# Patient Record
Sex: MALE | Race: WHITE | NOT HISPANIC OR LATINO | Employment: OTHER | URBAN - METROPOLITAN AREA
[De-identification: names, ages, dates, MRNs, and addresses within clinical notes are randomized per-mention and may not be internally consistent; named-entity substitution may affect disease eponyms.]

---

## 2023-08-11 ENCOUNTER — HOSPITAL ENCOUNTER (INPATIENT)
Facility: MEDICAL CENTER | Age: 68
LOS: 3 days | DRG: 442 | End: 2023-08-14
Attending: HOSPITALIST | Admitting: HOSPITALIST
Payer: MEDICARE

## 2023-08-11 DIAGNOSIS — I81 PORTAL VEIN THROMBOSIS: ICD-10-CM

## 2023-08-11 DIAGNOSIS — K74.60 CIRRHOSIS OF LIVER WITHOUT ASCITES, UNSPECIFIED HEPATIC CIRRHOSIS TYPE (HCC): ICD-10-CM

## 2023-08-11 PROBLEM — R91.8 PULMONARY NODULES: Status: ACTIVE | Noted: 2023-08-11

## 2023-08-11 PROBLEM — Z71.89 ACP (ADVANCE CARE PLANNING): Status: ACTIVE | Noted: 2023-08-11

## 2023-08-11 PROBLEM — Z72.0 TOBACCO ABUSE: Status: ACTIVE | Noted: 2023-08-11

## 2023-08-11 PROBLEM — D45 POLYCYTHEMIA VERA (HCC): Status: ACTIVE | Noted: 2023-08-11

## 2023-08-11 PROBLEM — I85.00 ESOPHAGEAL VARICES WITHOUT BLEEDING (HCC): Status: ACTIVE | Noted: 2023-08-11

## 2023-08-11 PROBLEM — R79.89 ELEVATED LFTS: Status: ACTIVE | Noted: 2023-08-11

## 2023-08-11 PROBLEM — R10.9 ABDOMINAL PAIN: Status: ACTIVE | Noted: 2023-08-11

## 2023-08-11 LAB
ALBUMIN SERPL BCP-MCNC: 4.3 G/DL (ref 3.2–4.9)
ALBUMIN/GLOB SERPL: 1.1 G/DL
ALP SERPL-CCNC: 72 U/L (ref 30–99)
ALT SERPL-CCNC: 84 U/L (ref 2–50)
AMMONIA PLAS-SCNC: 37 UMOL/L (ref 11–45)
AMPHET UR QL SCN: NEGATIVE
ANION GAP SERPL CALC-SCNC: 15 MMOL/L (ref 7–16)
APPEARANCE UR: CLEAR
APTT PPP: 29.4 SEC (ref 24.7–36)
APTT PPP: 31.5 SEC (ref 24.7–36)
AST SERPL-CCNC: 50 U/L (ref 12–45)
BARBITURATES UR QL SCN: NEGATIVE
BASOPHILS # BLD AUTO: 0.2 % (ref 0–1.8)
BASOPHILS # BLD: 0.02 K/UL (ref 0–0.12)
BENZODIAZ UR QL SCN: NEGATIVE
BILIRUB SERPL-MCNC: 0.6 MG/DL (ref 0.1–1.5)
BILIRUB UR QL STRIP.AUTO: NEGATIVE
BUN SERPL-MCNC: 14 MG/DL (ref 8–22)
BZE UR QL SCN: NEGATIVE
CALCIUM ALBUM COR SERPL-MCNC: 9.3 MG/DL (ref 8.5–10.5)
CALCIUM SERPL-MCNC: 9.5 MG/DL (ref 8.5–10.5)
CANNABINOIDS UR QL SCN: POSITIVE
CFT BLD TEG: 5.6 MIN (ref 4.6–9.1)
CFT P HPASE BLD TEG: 5.8 MIN (ref 4.3–8.3)
CHLORIDE SERPL-SCNC: 99 MMOL/L (ref 96–112)
CLOT ANGLE BLD TEG: 70.2 DEGREES (ref 63–78)
CLOT LYSIS 30M P MA LENFR BLD TEG: 0 % (ref 0–2.6)
CO2 SERPL-SCNC: 20 MMOL/L (ref 20–33)
COLOR UR: YELLOW
CREAT SERPL-MCNC: 0.9 MG/DL (ref 0.5–1.4)
CRP SERPL HS-MCNC: 2.42 MG/DL (ref 0–0.75)
CT.EXTRINSIC BLD ROTEM: 1.9 MIN (ref 0.8–2.1)
D DIMER PPP IA.FEU-MCNC: 3.25 UG/ML (FEU) (ref 0–0.5)
EOSINOPHIL # BLD AUTO: 0.01 K/UL (ref 0–0.51)
EOSINOPHIL NFR BLD: 0.1 % (ref 0–6.9)
ERYTHROCYTE [DISTWIDTH] IN BLOOD BY AUTOMATED COUNT: 45.3 FL (ref 35.9–50)
ERYTHROCYTE [SEDIMENTATION RATE] IN BLOOD BY WESTERGREN METHOD: 1 MM/HOUR (ref 0–20)
FENTANYL UR QL: NEGATIVE
FERRITIN SERPL-MCNC: 979 NG/ML (ref 22–322)
FIBRINOGEN PPP-MCNC: 437 MG/DL (ref 215–460)
FOLATE SERPL-MCNC: 13.6 NG/ML
GFR SERPLBLD CREATININE-BSD FMLA CKD-EPI: 93 ML/MIN/1.73 M 2
GGT SERPL-CCNC: 70 U/L (ref 7–51)
GLOBULIN SER CALC-MCNC: 3.9 G/DL (ref 1.9–3.5)
GLUCOSE SERPL-MCNC: 104 MG/DL (ref 65–99)
GLUCOSE UR STRIP.AUTO-MCNC: NEGATIVE MG/DL
HAV IGM SERPL QL IA: ABNORMAL
HBV CORE IGM SER QL: ABNORMAL
HBV SURFACE AG SER QL: ABNORMAL
HCT VFR BLD AUTO: 46.9 % (ref 42–52)
HCV AB SER QL: REACTIVE
HGB BLD-MCNC: 16.4 G/DL (ref 14–18)
HGB RETIC QN AUTO: 36.8 PG/CELL (ref 29–35)
HIV 1+2 AB+HIV1 P24 AG SERPL QL IA: NORMAL
IMM GRANULOCYTES # BLD AUTO: 0.04 K/UL (ref 0–0.11)
IMM GRANULOCYTES NFR BLD AUTO: 0.4 % (ref 0–0.9)
IMM RETICS NFR: 9.5 % (ref 2.6–16.1)
INR PPP: 1.1 (ref 0.87–1.13)
IRON SATN MFR SERPL: 14 % (ref 15–55)
IRON SERPL-MCNC: 45 UG/DL (ref 50–180)
KETONES UR STRIP.AUTO-MCNC: ABNORMAL MG/DL
LDH SERPL L TO P-CCNC: 258 U/L (ref 107–266)
LEUKOCYTE ESTERASE UR QL STRIP.AUTO: NEGATIVE
LYMPHOCYTES # BLD AUTO: 1.78 K/UL (ref 1–4.8)
LYMPHOCYTES NFR BLD: 18 % (ref 22–41)
MAGNESIUM SERPL-MCNC: 2.2 MG/DL (ref 1.5–2.5)
MCF BLD TEG: 51.6 MM (ref 52–69)
MCF.PLATELET INHIB BLD ROTEM: 19.2 MM (ref 15–32)
MCH RBC QN AUTO: 33.4 PG (ref 27–33)
MCHC RBC AUTO-ENTMCNC: 35 G/DL (ref 32.3–36.5)
MCV RBC AUTO: 95.5 FL (ref 81.4–97.8)
METHADONE UR QL SCN: NEGATIVE
MICRO URNS: ABNORMAL
MONOCYTES # BLD AUTO: 0.85 K/UL (ref 0–0.85)
MONOCYTES NFR BLD AUTO: 8.6 % (ref 0–13.4)
NEUTROPHILS # BLD AUTO: 7.2 K/UL (ref 1.82–7.42)
NEUTROPHILS NFR BLD: 72.7 % (ref 44–72)
NITRITE UR QL STRIP.AUTO: NEGATIVE
NRBC # BLD AUTO: 0 K/UL
NRBC BLD-RTO: 0 /100 WBC (ref 0–0.2)
OPIATES UR QL SCN: NEGATIVE
OXYCODONE UR QL SCN: NEGATIVE
PCP UR QL SCN: NEGATIVE
PH UR STRIP.AUTO: 6 [PH] (ref 5–8)
PHOSPHATE SERPL-MCNC: 2.6 MG/DL (ref 2.5–4.5)
PLATELET # BLD AUTO: 142 K/UL (ref 164–446)
PMV BLD AUTO: 9.8 FL (ref 9–12.9)
POTASSIUM SERPL-SCNC: 4.1 MMOL/L (ref 3.6–5.5)
PROPOXYPH UR QL SCN: NEGATIVE
PROT SERPL-MCNC: 8.2 G/DL (ref 6–8.2)
PROT UR QL STRIP: NEGATIVE MG/DL
PROTHROMBIN TIME: 14.1 SEC (ref 12–14.6)
RBC # BLD AUTO: 4.91 M/UL (ref 4.7–6.1)
RBC UR QL AUTO: NEGATIVE
RETICS # AUTO: 0.09 M/UL (ref 0.04–0.12)
RETICS/RBC NFR: 1.8 % (ref 0.8–2.6)
SODIUM SERPL-SCNC: 134 MMOL/L (ref 135–145)
SP GR UR STRIP.AUTO: >=1.045
TEG ALGORITHM TGALG: ABNORMAL
TIBC SERPL-MCNC: 318 UG/DL (ref 250–450)
TRANSFERRIN SERPL-MCNC: 267 MG/DL (ref 200–370)
UFH PPP CHRO-ACNC: 0.38 IU/ML
UIBC SERPL-MCNC: 273 UG/DL (ref 110–370)
UROBILINOGEN UR STRIP.AUTO-MCNC: 1 MG/DL
VIT B12 SERPL-MCNC: 674 PG/ML (ref 211–911)
WBC # BLD AUTO: 9.9 K/UL (ref 4.8–10.8)

## 2023-08-11 PROCEDURE — 85730 THROMBOPLASTIN TIME PARTIAL: CPT | Mod: 91

## 2023-08-11 PROCEDURE — 86038 ANTINUCLEAR ANTIBODIES: CPT

## 2023-08-11 PROCEDURE — 81003 URINALYSIS AUTO W/O SCOPE: CPT

## 2023-08-11 PROCEDURE — 86015 ACTIN ANTIBODY EACH: CPT

## 2023-08-11 PROCEDURE — 700105 HCHG RX REV CODE 258: Performed by: STUDENT IN AN ORGANIZED HEALTH CARE EDUCATION/TRAINING PROGRAM

## 2023-08-11 PROCEDURE — 82140 ASSAY OF AMMONIA: CPT

## 2023-08-11 PROCEDURE — 85576 BLOOD PLATELET AGGREGATION: CPT

## 2023-08-11 PROCEDURE — 82746 ASSAY OF FOLIC ACID SERUM: CPT

## 2023-08-11 PROCEDURE — 83735 ASSAY OF MAGNESIUM: CPT

## 2023-08-11 PROCEDURE — 700111 HCHG RX REV CODE 636 W/ 250 OVERRIDE (IP): Performed by: STUDENT IN AN ORGANIZED HEALTH CARE EDUCATION/TRAINING PROGRAM

## 2023-08-11 PROCEDURE — 85347 COAGULATION TIME ACTIVATED: CPT

## 2023-08-11 PROCEDURE — 83540 ASSAY OF IRON: CPT

## 2023-08-11 PROCEDURE — 85520 HEPARIN ASSAY: CPT

## 2023-08-11 PROCEDURE — 85379 FIBRIN DEGRADATION QUANT: CPT

## 2023-08-11 PROCEDURE — 85384 FIBRINOGEN ACTIVITY: CPT

## 2023-08-11 PROCEDURE — 85025 COMPLETE CBC W/AUTO DIFF WBC: CPT

## 2023-08-11 PROCEDURE — 85306 CLOT INHIBIT PROT S FREE: CPT

## 2023-08-11 PROCEDURE — 82105 ALPHA-FETOPROTEIN SERUM: CPT

## 2023-08-11 PROCEDURE — G0475 HIV COMBINATION ASSAY: HCPCS

## 2023-08-11 PROCEDURE — 99497 ADVNCD CARE PLAN 30 MIN: CPT | Mod: 25 | Performed by: STUDENT IN AN ORGANIZED HEALTH CARE EDUCATION/TRAINING PROGRAM

## 2023-08-11 PROCEDURE — 80307 DRUG TEST PRSMV CHEM ANLYZR: CPT

## 2023-08-11 PROCEDURE — 85301 ANTITHROMBIN III ANTIGEN: CPT

## 2023-08-11 PROCEDURE — 84466 ASSAY OF TRANSFERRIN: CPT

## 2023-08-11 PROCEDURE — 83010 ASSAY OF HAPTOGLOBIN QUANT: CPT

## 2023-08-11 PROCEDURE — 82728 ASSAY OF FERRITIN: CPT

## 2023-08-11 PROCEDURE — 85652 RBC SED RATE AUTOMATED: CPT

## 2023-08-11 PROCEDURE — 770006 HCHG ROOM/CARE - MED/SURG/GYN SEMI*

## 2023-08-11 PROCEDURE — 85303 CLOT INHIBIT PROT C ACTIVITY: CPT

## 2023-08-11 PROCEDURE — 86140 C-REACTIVE PROTEIN: CPT

## 2023-08-11 PROCEDURE — 80074 ACUTE HEPATITIS PANEL: CPT

## 2023-08-11 PROCEDURE — 84100 ASSAY OF PHOSPHORUS: CPT

## 2023-08-11 PROCEDURE — 36415 COLL VENOUS BLD VENIPUNCTURE: CPT

## 2023-08-11 PROCEDURE — 86381 MITOCHONDRIAL ANTIBODY EACH: CPT

## 2023-08-11 PROCEDURE — 82607 VITAMIN B-12: CPT

## 2023-08-11 PROCEDURE — 81270 JAK2 GENE: CPT

## 2023-08-11 PROCEDURE — 81338 MPL GENE COMMON VARIANTS: CPT

## 2023-08-11 PROCEDURE — 83615 LACTATE (LD) (LDH) ENZYME: CPT

## 2023-08-11 PROCEDURE — 85300 ANTITHROMBIN III ACTIVITY: CPT

## 2023-08-11 PROCEDURE — 99223 1ST HOSP IP/OBS HIGH 75: CPT | Mod: 25,AI | Performed by: STUDENT IN AN ORGANIZED HEALTH CARE EDUCATION/TRAINING PROGRAM

## 2023-08-11 PROCEDURE — 80053 COMPREHEN METABOLIC PANEL: CPT

## 2023-08-11 PROCEDURE — 81219 CALR GENE COM VARIANTS: CPT

## 2023-08-11 PROCEDURE — 85046 RETICYTE/HGB CONCENTRATE: CPT

## 2023-08-11 PROCEDURE — 82977 ASSAY OF GGT: CPT

## 2023-08-11 PROCEDURE — 83550 IRON BINDING TEST: CPT

## 2023-08-11 PROCEDURE — 87522 HEPATITIS C REVRS TRNSCRPJ: CPT

## 2023-08-11 PROCEDURE — 85610 PROTHROMBIN TIME: CPT

## 2023-08-11 PROCEDURE — C9113 INJ PANTOPRAZOLE SODIUM, VIA: HCPCS | Performed by: STUDENT IN AN ORGANIZED HEALTH CARE EDUCATION/TRAINING PROGRAM

## 2023-08-11 PROCEDURE — 99406 BEHAV CHNG SMOKING 3-10 MIN: CPT | Performed by: STUDENT IN AN ORGANIZED HEALTH CARE EDUCATION/TRAINING PROGRAM

## 2023-08-11 RX ORDER — ONDANSETRON 2 MG/ML
4 INJECTION INTRAMUSCULAR; INTRAVENOUS EVERY 4 HOURS PRN
Status: DISCONTINUED | OUTPATIENT
Start: 2023-08-11 | End: 2023-08-14 | Stop reason: HOSPADM

## 2023-08-11 RX ORDER — PANTOPRAZOLE SODIUM 40 MG/10ML
40 INJECTION, POWDER, LYOPHILIZED, FOR SOLUTION INTRAVENOUS 2 TIMES DAILY
Status: DISCONTINUED | OUTPATIENT
Start: 2023-08-11 | End: 2023-08-13

## 2023-08-11 RX ORDER — HEPARIN SODIUM 5000 [USP'U]/100ML
0-30 INJECTION, SOLUTION INTRAVENOUS CONTINUOUS
Status: DISCONTINUED | OUTPATIENT
Start: 2023-08-11 | End: 2023-08-11

## 2023-08-11 RX ORDER — POLYETHYLENE GLYCOL 3350 17 G/17G
1 POWDER, FOR SOLUTION ORAL
Status: DISCONTINUED | OUTPATIENT
Start: 2023-08-11 | End: 2023-08-14 | Stop reason: HOSPADM

## 2023-08-11 RX ORDER — OXYCODONE HYDROCHLORIDE 5 MG/1
5 TABLET ORAL
Status: DISCONTINUED | OUTPATIENT
Start: 2023-08-11 | End: 2023-08-14 | Stop reason: HOSPADM

## 2023-08-11 RX ORDER — ONDANSETRON 4 MG/1
4 TABLET, ORALLY DISINTEGRATING ORAL EVERY 4 HOURS PRN
Status: DISCONTINUED | OUTPATIENT
Start: 2023-08-11 | End: 2023-08-14 | Stop reason: HOSPADM

## 2023-08-11 RX ORDER — LABETALOL HYDROCHLORIDE 5 MG/ML
10 INJECTION, SOLUTION INTRAVENOUS EVERY 4 HOURS PRN
Status: DISCONTINUED | OUTPATIENT
Start: 2023-08-11 | End: 2023-08-14 | Stop reason: HOSPADM

## 2023-08-11 RX ORDER — OXYCODONE HYDROCHLORIDE 10 MG/1
10 TABLET ORAL
Status: DISCONTINUED | OUTPATIENT
Start: 2023-08-11 | End: 2023-08-14 | Stop reason: HOSPADM

## 2023-08-11 RX ORDER — SODIUM CHLORIDE, SODIUM LACTATE, POTASSIUM CHLORIDE, AND CALCIUM CHLORIDE .6; .31; .03; .02 G/100ML; G/100ML; G/100ML; G/100ML
500 INJECTION, SOLUTION INTRAVENOUS
Status: DISCONTINUED | OUTPATIENT
Start: 2023-08-11 | End: 2023-08-14 | Stop reason: HOSPADM

## 2023-08-11 RX ORDER — HYDROMORPHONE HYDROCHLORIDE 1 MG/ML
0.5 INJECTION, SOLUTION INTRAMUSCULAR; INTRAVENOUS; SUBCUTANEOUS
Status: DISCONTINUED | OUTPATIENT
Start: 2023-08-11 | End: 2023-08-12

## 2023-08-11 RX ORDER — HEPARIN SODIUM 1000 [USP'U]/ML
40 INJECTION, SOLUTION INTRAVENOUS; SUBCUTANEOUS PRN
Status: DISCONTINUED | OUTPATIENT
Start: 2023-08-11 | End: 2023-08-11

## 2023-08-11 RX ORDER — BISACODYL 10 MG
10 SUPPOSITORY, RECTAL RECTAL
Status: DISCONTINUED | OUTPATIENT
Start: 2023-08-11 | End: 2023-08-14 | Stop reason: HOSPADM

## 2023-08-11 RX ORDER — SODIUM CHLORIDE 9 MG/ML
INJECTION, SOLUTION INTRAVENOUS CONTINUOUS
Status: DISCONTINUED | OUTPATIENT
Start: 2023-08-11 | End: 2023-08-13

## 2023-08-11 RX ORDER — AMOXICILLIN 250 MG
2 CAPSULE ORAL 2 TIMES DAILY
Status: DISCONTINUED | OUTPATIENT
Start: 2023-08-11 | End: 2023-08-14 | Stop reason: HOSPADM

## 2023-08-11 RX ADMIN — SODIUM CHLORIDE: 9 INJECTION, SOLUTION INTRAVENOUS at 18:21

## 2023-08-11 RX ADMIN — PANTOPRAZOLE SODIUM 40 MG: 40 INJECTION, POWDER, FOR SOLUTION INTRAVENOUS at 18:21

## 2023-08-11 ASSESSMENT — LIFESTYLE VARIABLES
HAVE YOU EVER FELT YOU SHOULD CUT DOWN ON YOUR DRINKING: NO
TOTAL SCORE: 0
SUBSTANCE_ABUSE: 0
CONSUMPTION TOTAL: INCOMPLETE
EVER HAD A DRINK FIRST THING IN THE MORNING TO STEADY YOUR NERVES TO GET RID OF A HANGOVER: NO
ALCOHOL_USE: YES
EVER HAD A DRINK FIRST THING IN THE MORNING TO STEADY YOUR NERVES TO GET RID OF A HANGOVER: NO
TOTAL SCORE: 0
TOTAL SCORE: 0
HAVE PEOPLE ANNOYED YOU BY CRITICIZING YOUR DRINKING: NO
DOES PATIENT WANT TO STOP DRINKING: NO
HAVE YOU EVER FELT YOU SHOULD CUT DOWN ON YOUR DRINKING: NO
HAVE PEOPLE ANNOYED YOU BY CRITICIZING YOUR DRINKING: NO
HAVE YOU EVER FELT YOU SHOULD CUT DOWN ON YOUR DRINKING: NO
CONSUMPTION TOTAL: INCOMPLETE
TOTAL SCORE: 0
EVER FELT BAD OR GUILTY ABOUT YOUR DRINKING: NO
HAVE PEOPLE ANNOYED YOU BY CRITICIZING YOUR DRINKING: NO
EVER HAD A DRINK FIRST THING IN THE MORNING TO STEADY YOUR NERVES TO GET RID OF A HANGOVER: NO
ON A TYPICAL DAY WHEN YOU DRINK ALCOHOL HOW MANY DRINKS DO YOU HAVE: 0
CONSUMPTION TOTAL: NEGATIVE
TOTAL SCORE: 0
AVERAGE NUMBER OF DAYS PER WEEK YOU HAVE A DRINK CONTAINING ALCOHOL: 0
TOTAL SCORE: 0
ALCOHOL_USE: YES
EVER FELT BAD OR GUILTY ABOUT YOUR DRINKING: NO
TOTAL SCORE: 0
TOTAL SCORE: 0
EVER FELT BAD OR GUILTY ABOUT YOUR DRINKING: NO
ALCOHOL_USE: NO
TOTAL SCORE: 0
HOW MANY TIMES IN THE PAST YEAR HAVE YOU HAD 5 OR MORE DRINKS IN A DAY: 0

## 2023-08-11 ASSESSMENT — ENCOUNTER SYMPTOMS
NAUSEA: 1
BRUISES/BLEEDS EASILY: 0
DIZZINESS: 0
HEADACHES: 0
DOUBLE VISION: 0
PALPITATIONS: 0
COUGH: 0
SHORTNESS OF BREATH: 0
BLOOD IN STOOL: 0
MYALGIAS: 0
DEPRESSION: 0
HEARTBURN: 1
FEVER: 0
CHILLS: 0
ABDOMINAL PAIN: 1
BLURRED VISION: 0
VOMITING: 1

## 2023-08-11 ASSESSMENT — COGNITIVE AND FUNCTIONAL STATUS - GENERAL
DAILY ACTIVITIY SCORE: 24
SUGGESTED CMS G CODE MODIFIER MOBILITY: CH
MOBILITY SCORE: 24
SUGGESTED CMS G CODE MODIFIER DAILY ACTIVITY: CH

## 2023-08-11 ASSESSMENT — PATIENT HEALTH QUESTIONNAIRE - PHQ9
2. FEELING DOWN, DEPRESSED, IRRITABLE, OR HOPELESS: NOT AT ALL
SUM OF ALL RESPONSES TO PHQ9 QUESTIONS 1 AND 2: 0
1. LITTLE INTEREST OR PLEASURE IN DOING THINGS: NOT AT ALL

## 2023-08-11 ASSESSMENT — PAIN DESCRIPTION - PAIN TYPE: TYPE: ACUTE PAIN

## 2023-08-11 NOTE — PROGRESS NOTES
"[facility] TRIAGE OFFICER DIRECT ADMISSION REPORT  Transferring facility: [facility]     Transferring physician: Dr Hyman    Chief complaint: Abdominal pain    Pertinent history & patient course: 68 years old male with no significant past medical history of tobacco use and intermittent alcohol use, reportedly stopped 4 months ago.  Initially presented at the transferring facility with abdominal pain with no bleeding, vital signs are within normal limits.  Hemoglobin 18, platelets 159.  CT imaging shows evidence of occlusive thrombus of the portal vein with surrounding thrombophlebitis.  The patient will be started on heparin drip.  He will require pain control, GI consultation to rule out varices with possible need for banding.  He will also need thrombophilia screening including JAK2 mutation screen and cancer screening.     Code Status: Full per transferring provider, I personally verified with the transferring provider patient's code status and the transferring provider has confirmed this with the patient.    Patient accepted for transfer: Yes    Consultants to be called upon arrival: Will need gastroenterology    Admission status: Inpatient.     Floor requested: Medical    Please inform the \"Triage Coord.-RTOC\" through Vo[employer] upon arrival of the patient to [facility] for assignment of a hospitalist to perform admission. Reach out to the assigned hospitalist (assigned by RTOC) for any questions or concerns regarding the care of this patient.          "

## 2023-08-12 ENCOUNTER — APPOINTMENT (OUTPATIENT)
Dept: RADIOLOGY | Facility: MEDICAL CENTER | Age: 68
DRG: 442 | End: 2023-08-12
Payer: MEDICARE

## 2023-08-12 ENCOUNTER — APPOINTMENT (OUTPATIENT)
Dept: RADIOLOGY | Facility: MEDICAL CENTER | Age: 68
DRG: 442 | End: 2023-08-12
Attending: NURSE PRACTITIONER
Payer: MEDICARE

## 2023-08-12 LAB
ALBUMIN SERPL BCP-MCNC: 3.4 G/DL (ref 3.2–4.9)
ALBUMIN/GLOB SERPL: 1.1 G/DL
ALP SERPL-CCNC: 58 U/L (ref 30–99)
ALT SERPL-CCNC: 66 U/L (ref 2–50)
ANION GAP SERPL CALC-SCNC: 10 MMOL/L (ref 7–16)
APTT PPP: 27.5 SEC (ref 24.7–36)
AST SERPL-CCNC: 45 U/L (ref 12–45)
BASOPHILS # BLD AUTO: 0.5 % (ref 0–1.8)
BASOPHILS # BLD: 0.03 K/UL (ref 0–0.12)
BILIRUB SERPL-MCNC: 0.6 MG/DL (ref 0.1–1.5)
BUN SERPL-MCNC: 12 MG/DL (ref 8–22)
CALCIUM ALBUM COR SERPL-MCNC: 9.1 MG/DL (ref 8.5–10.5)
CALCIUM SERPL-MCNC: 8.6 MG/DL (ref 8.5–10.5)
CHLORIDE SERPL-SCNC: 103 MMOL/L (ref 96–112)
CO2 SERPL-SCNC: 23 MMOL/L (ref 20–33)
CREAT SERPL-MCNC: 0.87 MG/DL (ref 0.5–1.4)
EOSINOPHIL # BLD AUTO: 0.1 K/UL (ref 0–0.51)
EOSINOPHIL NFR BLD: 1.5 % (ref 0–6.9)
ERYTHROCYTE [DISTWIDTH] IN BLOOD BY AUTOMATED COUNT: 45.7 FL (ref 35.9–50)
GFR SERPLBLD CREATININE-BSD FMLA CKD-EPI: 94 ML/MIN/1.73 M 2
GLOBULIN SER CALC-MCNC: 3.2 G/DL (ref 1.9–3.5)
GLUCOSE SERPL-MCNC: 88 MG/DL (ref 65–99)
HCT VFR BLD AUTO: 41.8 % (ref 42–52)
HCT VFR BLD AUTO: 45.1 % (ref 42–52)
HEMOCCULT STL QL: NEGATIVE
HGB BLD-MCNC: 14.5 G/DL (ref 14–18)
HGB BLD-MCNC: 15.5 G/DL (ref 14–18)
IMM GRANULOCYTES # BLD AUTO: 0.03 K/UL (ref 0–0.11)
IMM GRANULOCYTES NFR BLD AUTO: 0.5 % (ref 0–0.9)
INR PPP: 1.09 (ref 0.87–1.13)
LYMPHOCYTES # BLD AUTO: 1.97 K/UL (ref 1–4.8)
LYMPHOCYTES NFR BLD: 29.8 % (ref 22–41)
MAGNESIUM SERPL-MCNC: 2.1 MG/DL (ref 1.5–2.5)
MCH RBC QN AUTO: 33 PG (ref 27–33)
MCHC RBC AUTO-ENTMCNC: 34.7 G/DL (ref 32.3–36.5)
MCV RBC AUTO: 95.2 FL (ref 81.4–97.8)
MONOCYTES # BLD AUTO: 0.77 K/UL (ref 0–0.85)
MONOCYTES NFR BLD AUTO: 11.6 % (ref 0–13.4)
NEUTROPHILS # BLD AUTO: 3.72 K/UL (ref 1.82–7.42)
NEUTROPHILS NFR BLD: 56.1 % (ref 44–72)
NRBC # BLD AUTO: 0 K/UL
NRBC BLD-RTO: 0 /100 WBC (ref 0–0.2)
PHOSPHATE SERPL-MCNC: 3.1 MG/DL (ref 2.5–4.5)
PLATELET # BLD AUTO: 125 K/UL (ref 164–446)
PMV BLD AUTO: 10 FL (ref 9–12.9)
POTASSIUM SERPL-SCNC: 4 MMOL/L (ref 3.6–5.5)
PROT SERPL-MCNC: 6.6 G/DL (ref 6–8.2)
PROTHROMBIN TIME: 13.9 SEC (ref 12–14.6)
RBC # BLD AUTO: 4.39 M/UL (ref 4.7–6.1)
SODIUM SERPL-SCNC: 136 MMOL/L (ref 135–145)
UFH PPP CHRO-ACNC: <0.1 IU/ML
WBC # BLD AUTO: 6.6 K/UL (ref 4.8–10.8)

## 2023-08-12 PROCEDURE — 700117 HCHG RX CONTRAST REV CODE 255

## 2023-08-12 PROCEDURE — 770006 HCHG ROOM/CARE - MED/SURG/GYN SEMI*

## 2023-08-12 PROCEDURE — 85014 HEMATOCRIT: CPT

## 2023-08-12 PROCEDURE — 85025 COMPLETE CBC W/AUTO DIFF WBC: CPT

## 2023-08-12 PROCEDURE — 36415 COLL VENOUS BLD VENIPUNCTURE: CPT

## 2023-08-12 PROCEDURE — C9113 INJ PANTOPRAZOLE SODIUM, VIA: HCPCS | Performed by: STUDENT IN AN ORGANIZED HEALTH CARE EDUCATION/TRAINING PROGRAM

## 2023-08-12 PROCEDURE — 700111 HCHG RX REV CODE 636 W/ 250 OVERRIDE (IP)

## 2023-08-12 PROCEDURE — 85730 THROMBOPLASTIN TIME PARTIAL: CPT

## 2023-08-12 PROCEDURE — 84100 ASSAY OF PHOSPHORUS: CPT

## 2023-08-12 PROCEDURE — 71260 CT THORAX DX C+: CPT

## 2023-08-12 PROCEDURE — 85610 PROTHROMBIN TIME: CPT

## 2023-08-12 PROCEDURE — A9270 NON-COVERED ITEM OR SERVICE: HCPCS | Performed by: STUDENT IN AN ORGANIZED HEALTH CARE EDUCATION/TRAINING PROGRAM

## 2023-08-12 PROCEDURE — 700105 HCHG RX REV CODE 258: Performed by: STUDENT IN AN ORGANIZED HEALTH CARE EDUCATION/TRAINING PROGRAM

## 2023-08-12 PROCEDURE — 99233 SBSQ HOSP IP/OBS HIGH 50: CPT | Mod: GC | Performed by: INTERNAL MEDICINE

## 2023-08-12 PROCEDURE — 80053 COMPREHEN METABOLIC PANEL: CPT

## 2023-08-12 PROCEDURE — 85018 HEMOGLOBIN: CPT

## 2023-08-12 PROCEDURE — 82272 OCCULT BLD FECES 1-3 TESTS: CPT

## 2023-08-12 PROCEDURE — 700102 HCHG RX REV CODE 250 W/ 637 OVERRIDE(OP): Performed by: STUDENT IN AN ORGANIZED HEALTH CARE EDUCATION/TRAINING PROGRAM

## 2023-08-12 PROCEDURE — 83735 ASSAY OF MAGNESIUM: CPT

## 2023-08-12 PROCEDURE — 700111 HCHG RX REV CODE 636 W/ 250 OVERRIDE (IP): Performed by: STUDENT IN AN ORGANIZED HEALTH CARE EDUCATION/TRAINING PROGRAM

## 2023-08-12 PROCEDURE — 85520 HEPARIN ASSAY: CPT

## 2023-08-12 RX ORDER — HEPARIN SODIUM 1000 [USP'U]/ML
40 INJECTION, SOLUTION INTRAVENOUS; SUBCUTANEOUS PRN
Status: DISCONTINUED | OUTPATIENT
Start: 2023-08-12 | End: 2023-08-13

## 2023-08-12 RX ORDER — IPRATROPIUM BROMIDE AND ALBUTEROL SULFATE 2.5; .5 MG/3ML; MG/3ML
3 SOLUTION RESPIRATORY (INHALATION)
Status: DISCONTINUED | OUTPATIENT
Start: 2023-08-12 | End: 2023-08-14

## 2023-08-12 RX ORDER — PREDNISONE 20 MG/1
20 TABLET ORAL 2 TIMES DAILY
Status: DISCONTINUED | OUTPATIENT
Start: 2023-08-12 | End: 2023-08-12

## 2023-08-12 RX ORDER — HEPARIN SODIUM 1000 [USP'U]/ML
80 INJECTION, SOLUTION INTRAVENOUS; SUBCUTANEOUS ONCE
Status: COMPLETED | OUTPATIENT
Start: 2023-08-12 | End: 2023-08-12

## 2023-08-12 RX ORDER — HYDROMORPHONE HYDROCHLORIDE 1 MG/ML
0.5 INJECTION, SOLUTION INTRAMUSCULAR; INTRAVENOUS; SUBCUTANEOUS
Status: DISCONTINUED | OUTPATIENT
Start: 2023-08-12 | End: 2023-08-14 | Stop reason: HOSPADM

## 2023-08-12 RX ORDER — HEPARIN SODIUM 5000 [USP'U]/100ML
0-30 INJECTION, SOLUTION INTRAVENOUS CONTINUOUS
Status: DISPENSED | OUTPATIENT
Start: 2023-08-12 | End: 2023-08-13

## 2023-08-12 RX ADMIN — OXYCODONE HYDROCHLORIDE 10 MG: 10 TABLET ORAL at 04:57

## 2023-08-12 RX ADMIN — SENNOSIDES AND DOCUSATE SODIUM 2 TABLET: 50; 8.6 TABLET ORAL at 18:03

## 2023-08-12 RX ADMIN — PANTOPRAZOLE SODIUM 40 MG: 40 INJECTION, POWDER, FOR SOLUTION INTRAVENOUS at 18:03

## 2023-08-12 RX ADMIN — PANTOPRAZOLE SODIUM 40 MG: 40 INJECTION, POWDER, FOR SOLUTION INTRAVENOUS at 04:18

## 2023-08-12 RX ADMIN — OXYCODONE HYDROCHLORIDE 10 MG: 10 TABLET ORAL at 18:30

## 2023-08-12 RX ADMIN — HEPARIN SODIUM 18 UNITS/KG/HR: 5000 INJECTION, SOLUTION INTRAVENOUS at 18:14

## 2023-08-12 RX ADMIN — OXYCODONE HYDROCHLORIDE 10 MG: 10 TABLET ORAL at 10:15

## 2023-08-12 RX ADMIN — OXYCODONE HYDROCHLORIDE 10 MG: 10 TABLET ORAL at 22:32

## 2023-08-12 RX ADMIN — SENNOSIDES AND DOCUSATE SODIUM 2 TABLET: 50; 8.6 TABLET ORAL at 04:17

## 2023-08-12 RX ADMIN — IOHEXOL 100 ML: 350 INJECTION, SOLUTION INTRAVENOUS at 16:15

## 2023-08-12 RX ADMIN — PREDNISONE 20 MG: 20 TABLET ORAL at 11:12

## 2023-08-12 RX ADMIN — HEPARIN SODIUM 6800 UNITS: 1000 INJECTION, SOLUTION INTRAVENOUS; SUBCUTANEOUS at 18:09

## 2023-08-12 RX ADMIN — SODIUM CHLORIDE: 9 INJECTION, SOLUTION INTRAVENOUS at 04:17

## 2023-08-12 ASSESSMENT — PAIN DESCRIPTION - PAIN TYPE
TYPE: ACUTE PAIN

## 2023-08-12 ASSESSMENT — ENCOUNTER SYMPTOMS
PALPITATIONS: 0
COUGH: 0
HEMOPTYSIS: 0
MYALGIAS: 0
PND: 0
FEVER: 0
ABDOMINAL PAIN: 1
SHORTNESS OF BREATH: 1
CHILLS: 0
BACK PAIN: 0
ORTHOPNEA: 0

## 2023-08-12 ASSESSMENT — FIBROSIS 4 INDEX: FIB4 SCORE: 2.97

## 2023-08-12 NOTE — ASSESSMENT & PLAN NOTE
EGD today reveals no obvious esophageal varices but remarkable for gastritis duodenitis.  Denies complaint of bleeding  He does have cirrhosis with portal hypertension  alcohol abstinence advised    Plan:  Oral PPI twice daily  GI follow-up appreciated  Monitor for sign of active bleeding (hematemesis melena)

## 2023-08-12 NOTE — ASSESSMENT & PLAN NOTE
Likely 2/2 portal hypertension secondary to portal vein thrombosis  Noted cirrhosis on CTAP  Follow up with  AFP

## 2023-08-12 NOTE — ASSESSMENT & PLAN NOTE
CTAP: newly occlusive thrombosis of the main portal vein near the confluence of the splenic vein and SMV with extension into the right and left portal vein and surrounding inflammation suggesting thrombophlebitis.  Cirrhosis with evidence of portal hypertension, including gastroesophageal varices    8/12/2023 ordered hypercoagulable work-up, AFP, TEG, and pertinent labs as requested  8/13/2023 EGD reveals no obvious esophageal varices, but presence of gastritis duodenitis s/p biopsy    Plan:  We will start anticoagulation  Oral PPI twice daily  Outpatient GI follow-up for HCV management  Lowly resume diet

## 2023-08-12 NOTE — ASSESSMENT & PLAN NOTE
Possible P.Vera  Hgb 15  Heavy smoking History  Monitor for symptoms of itching after hot shower, burning hands and feet. Plethora.

## 2023-08-12 NOTE — ASSESSMENT & PLAN NOTE
Goal of care discussed with patient in S6.  He stated he would like to discuss GOC with wife for now.  In the meantime, agreeable for noninvasive, as well as invasive/heroic life-sustaining measures-including CPR/defibrillation/intubation mechanical ventilation as needed.  Diagnosis, prognosis, questions and concerns addressed.  Full CODE STATUS for now.  ACP: 17 minutes

## 2023-08-12 NOTE — PROGRESS NOTES
4 Eyes Skin Assessment Completed by LITZY Galo and LITZY Hood.    Head WDL  Ears WDL  Nose WDL  Mouth WDL  Neck WDL  Breast/Chest WDL  Shoulder Blades Redness and Blanching  Spine Redness and Blanching  (R) Arm/Elbow/Hand WDL  (L) Arm/Elbow/Hand WDL  Abdomen WDL  Groin WDL  Scrotum/Coccyx/Buttocks WDL  (R) Leg Scar  (L) Leg Scar  (R) Heel/Foot/Toe Dry, blanchable redness  (L) Heel/Foot/Toe Redness and Blanching dry          Devices In Places N/A      Interventions In Place Pillows    Possible Skin Injury No    Pictures Uploaded Into Epic N/A  Wound Consult Placed N/A  RN Wound Prevention Protocol Ordered No

## 2023-08-12 NOTE — PROGRESS NOTES
"This note is intended for the purposes of medical student education and feedback only.   Please refer to the documentation by this patient's assigned medical practitioner for details of care and plans.    Medical Student Progress Note  Note Author: Tran Delgadillo, Student  Date: 8/12/2023    Date of Admission: 8/11/2023  Primary Team: SKINNY [facility]  Attending: Dr. Meeks  Senior Resident: Dr. Patel  Intern: Dr. Mike  Medical Student: Tran Delgadillo MS4    ID/CC: Dawit Maurer is a 68 y.o. male with history of alcohol abuse, tobacco abuse who presented 8/11/2023 as a direct transfer from [facility] for evaluation of abdominal distention, noted to have portal vein thrombosis, needing higher level care.    INTERVAL UPDATE FOR 8/12/2023  No acute overnight events, last drink multiple weeks ago. Patient complains of abdominal pain, has had 155 mg of morphine overnight    OBJECTIVE   Physical Exam:  /74   Pulse 66   Temp 36.6 °C (97.8 °F) (Temporal)   Resp 18   Ht 1.727 m (5' 8\")   Wt 85.6 kg (188 lb 11.4 oz)   SpO2 94%     Intake/Output Summary (Last 24 hours) at 8/12/2023 1140  Last data filed at 8/12/2023 0417  Gross per 24 hour   Intake --   Output 300 ml   Net -300 ml       General: Well developed, well nourished, male, appears stated age, appears to be in no acute distress.  HEENT: Normocephalic, atraumatic. EOM grossly intact, PERRLA. Mucous membranes moist.   Cardio: Normal S1 and S2. Regular rate and rhythm. No murmurs, rubs, or gallops.  Pulmonary: Wheezes present   Abdomen: Normoactive bowel sounds. Abdomen is soft and nondistended. No masses. No tenderness to palpation in all four quadrants.   MSK: Normal ROM. Extremities well-perfused. Capillary refill <2 seconds. No LE edema.  Neuro: A&Ox4. CN II-XII grossly intact.   Skin: No rash, lesions, or skin ulcers. No jaundice, ecchymoses, or petechiae.   Psych: Appropriate mood and affect.    Lab Results:  Recent Labs     08/11/23  1845 08/12/23  0146 " 08/12/23  0850   WBC 9.9 6.6  --    RBC 4.91 4.39*  --    HEMOGLOBIN 16.4 14.5 15.5   HEMATOCRIT 46.9 41.8* 45.1   MCV 95.5 95.2  --    MCH 33.4* 33.0  --    RDW 45.3 45.7  --    PLATELETCT 142* 125*  --    MPV 9.8 10.0  --    NEUTSPOLYS 72.70* 56.10  --    LYMPHOCYTES 18.00* 29.80  --    MONOCYTES 8.60 11.60  --    EOSINOPHILS 0.10 1.50  --    BASOPHILS 0.20 0.50  --      Recent Labs     08/11/23 1815 08/12/23  0146   SODIUM 134* 136   POTASSIUM 4.1 4.0   CHLORIDE 99 103   CO2 20 23   BUN 14 12   CREATININE 0.90 0.87   CALCIUM 9.5 8.6   MAGNESIUM 2.2 2.1   PHOSPHORUS 2.6 3.1   ALBUMIN 4.3 3.4     Estimated GFR/CRCL = Estimated Creatinine Clearance: 87.8 mL/min (by C-G formula based on SCr of 0.87 mg/dL).  Recent Labs     08/11/23 1815 08/12/23  0146   GLUCOSE 104* 88     Recent Labs     08/11/23  1814 08/11/23 1815 08/12/23  0146   ASTSGOT  --  50* 45   ALTSGPT  --  84* 66*   TBILIRUBIN  --  0.6 0.6   ALKPHOSPHAT  --  72 58   GLOBULIN  --  3.9* 3.2   INR 1.10  --   --    AMMONIA  --  37  --              Recent Labs     08/11/23 1814 08/11/23 1957   INR 1.10  --    APTT 29.4 31.5   FIBRINOGEN 437  --        Microbiology Results:  Results       Procedure Component Value Units Date/Time    URINALYSIS [[id number]]  (Abnormal) Collected: 08/11/23 2025    Order Status: Completed Specimen: Urine, Clean Catch Updated: 08/11/23 2128     Color Yellow     Character Clear     Specific Gravity >=1.045     Ph 6.0     Glucose Negative mg/dL      Ketones Trace mg/dL      Protein Negative mg/dL      Bilirubin Negative     Urobilinogen, Urine 1.0     Nitrite Negative     Leukocyte Esterase Negative     Occult Blood Negative     Micro Urine Req see below     Comment: Microscopic examination not performed when specimen is clear  and chemically negative for protein, blood, leukocyte esterase  and nitrite.         Narrative:      Collected By: [id number] STONEY LAWTON            Imaging Results:  EC-ECHOCARDIOGRAM COMPLETE W/O  CONT    (Results Pending)   CT-CHEST (THORAX) WITH    (Results Pending)   CT-ABDOMEN-PELVIS WITH    (Results Pending)       EKG  No results found for this or any previous visit.    Current Medications    Current Facility-Administered Medications:     HYDROmorphone (Dilaudid) injection 0.5 mg, 0.5 mg, Intravenous, Q3HRS PRN, Mercy Patel M.D.    predniSONE (Deltasone) tablet 20 mg, 20 mg, Oral, BID, Felice Mike M.D., 20 mg at 08/12/23 1112    senna-docusate (Pericolace Or Senokot S) 8.6-50 MG per tablet 2 Tablet, 2 Tablet, Oral, BID, 2 Tablet at 08/12/23 0417 **AND** polyethylene glycol/lytes (Miralax) PACKET 1 Packet, 1 Packet, Oral, QDAY PRN **AND** magnesium hydroxide (Milk Of Magnesia) suspension 30 mL, 30 mL, Oral, QDAY PRN **AND** bisacodyl (Dulcolax) suppository 10 mg, 10 mg, Rectal, QDAY PRN, Torrey Arnold M.D.    lactated ringers infusion (BOLUS), 500 mL, Intravenous, Once PRN, Torrey Arnold M.D.    labetalol (Normodyne/Trandate) injection 10 mg, 10 mg, Intravenous, Q4HRS PRN, Torrey Arnold M.D.    ondansetron (Zofran) syringe/vial injection 4 mg, 4 mg, Intravenous, Q4HRS PRN, Torrey Arnold M.D.    ondansetron (Zofran ODT) dispertab 4 mg, 4 mg, Oral, Q4HRS PRN, Torrey Arnold M.D.    pantoprazole (Protonix) injection 40 mg, 40 mg, Intravenous, BID, Torrey Arnold M.D., 40 mg at 08/12/23 0418    Pharmacy Consult Request ...Pain Management Review 1 Each, 1 Each, Other, PHARMACY TO DOSE, Torrey Arnold M.D.    oxyCODONE immediate-release (Roxicodone) tablet 5 mg, 5 mg, Oral, Q3HRS PRN **OR** oxyCODONE immediate release (Roxicodone) tablet 10 mg, 10 mg, Oral, Q3HRS PRN, 10 mg at 08/12/23 1015 **OR** [DISCONTINUED] HYDROmorphone (Dilaudid) injection 0.5 mg, 0.5 mg, Intravenous, Q3HRS PRN, Torrey Arnold M.D.    NS infusion, , Intravenous, Continuous, Torrey Arnold M.D., Last Rate: 100 mL/hr at 08/12/23 0417, New Bag at 08/12/23 0417    Nicotine Replacement Patient Education Materials, , , Once **AND** nicotine  polacrilex (Nicorette) 2 MG piece 2 mg, 2 mg, Oral, Q HOUR PRN, Torrey Arnold M.D.    ASSESSMENT/PLAN    * Portal vein thrombosis- (present on admission)  Assessment & Plan  From [facility]:  CTAP: newly occlusive thrombosis of the main portal vein near the confluence of the splenic vein and SMV with extension into the right and left portal vein and surrounding inflammation suggesting thrombophlebitis.  Cirrhosis with evidence of portal hypertension, including gastroesophageal varices     Discussed with GI, hold off anticoagulation at this time pending endoscopy   AFP pending, TEG resulted, and pertinent labs as requested  GI follow-up appreciated     Cirrhosis (HCC)  Assessment & Plan  Unclear etiology  Suspect secondary to EtOH given history of alcohol abuse  Alcohol abstinence advised  Denies complaint of bleeding or hematemesis  He does have cirrhosis with portal hypertension  Avoid VTE prophylaxis, NSAID product  alcohol abstinence advised     IV Protonix for now  GI follow-up appreciated, considering endoscopy     Polycythemia (HCC)  Assessment & Plan  Possible P.Vera vs. dehydration  Hgb improved today, hxtobacco use     Elevated LFTs  Assessment & Plan  Noted cirrhosis on CTAP  Positive for Hepatitis C, never treated. Ordered HIV panel  No Hx IVDU ,   AFP pending     Abdominal pain  Assessment & Plan  Supportive pain control        Pulmonary nodules  Assessment & Plan  Incidental 5 mm nodules noted  History of tobacco abuse  CT scan with contrast ordered   Outpatient follow-up as needed     Wheezing on exam  Assessment & Plan  Hx of asthma and allergies.   Adult PRN SVN ordered   Echocardiogram ordered      ACP (advance care planning)  Assessment & Plan  Goal of care discussed with patient in S6.  He stated he would like to discuss GOC with wife for now.  In the meantime, agreeable for noninvasive, as well as invasive/heroic life-sustaining measures-including CPR/defibrillation/intubation mechanical  ventilation as needed.  Diagnosis, prognosis, questions and concerns addressed.  Full CODE STATUS for now.  ACP: 17 minutes     Tobacco abuse  Assessment & Plan  Reported heavy tobacco use previously but stated he quit smoking approximately 25 years ago  He does admit to daily tobacco chewing, as well as marijuana use  Tobacco cessation counseling provided: 5 minutes  Discussed tobacco cessation for overall cardiovascular health benefit.  Offered nicotine patch, nicotine gum as alternative.  Provided standard tobacco cessation information per protocol    PROPHYLAXIS  DVT: None  GI:None  Other: FULL Code, NPO

## 2023-08-12 NOTE — PROGRESS NOTES
[facility] Internal Medicine Daily Progress Note    Date of Service  8/12/2023    R Team: R IM [facility] Team   Attending: Fernanda Meeks M.d.  Senior Resident: Dr. Mercy krause MD  Intern:  Dr. Felice chow MD  Contact Number: [phone]    Chief Complaint  Dawit Maurer is a 68 y.o. male admitted 8/11/2023 with abdominal pain.     Hospital Course  Dawit Maurer is a 68 y.o. male with history of alcohol abuse, tobacco abuse who presented 8/11/2023 as a direct transfer from [facility] for evaluation of abdominal distention, noted to have portal vein thrombosis, needing higher level care.   Work-up from [facility] ER included:  CBC: WBC 9.6, hemoglobin 18.1, platelet 159  CMP: Sodium 136, potassium 4.2, chloride 100, bicarb 22, glucose 129, BUN 12, creatinine 0.9, calcium 9.7, total protein 8.9, albumin 4.6, AST 74, , alkaline phosphatase 91, total bilirubin 0.9  PTT 25.1 PT 10.1  INR 1.03  Troponin I <0.05  Lipase 63  UA negative leukocyte esterase, negative nitrite.  CTAP: newly occlusive thrombosis of the main portal vein near the confluence of the splenic vein and SMV with extension into the right and left portal vein and surrounding inflammation suggesting thrombophlebitis.  Cirrhosis with evidence of portal hypertension, including gastroesophageal varices.  bilateral pulmonary nodules, measuring 5 mm or less. Patient stated ongoing abdominal distention, bloating, poor oral intake for the past few days.  He went to ER as symptoms not resolving.  Patient was ultimately transferred to [facility] for GI evaluation.  He does admit to consuming large quantity of beer daily with barbecue, admits to current drinking.  Denies NSAID abuse. Given his occupational history of working [place], Chest CT, echo and prednisone and nebulizer was started.    Interval Problem Update  Patient was seen and evaluated at bedside this AM. He is a pleasant patient with improvement in his symptoms since admission.  Patient was  counseled about his diagnosis, prognosis and treatment plan and he seems satisfied and understanding.     Does complain of abdominal discomfort likel 2/2 portal vein thrombosi. However denies nausea, vomiting chest pain.      Monitoring Labs, coagulopathies and tumor markers. Management will be followed by test result.  He was advised not to smoke and drink.      I have discussed this patient's plan of care and discharge plan at IDT rounds today with Case Management, Nursing, Nursing leadership, and other members of the IDT team.    Consultants/Specialty  GI    Code Status  Full Code    Disposition  The patient is not medically cleared for discharge to home or a post-acute facility.      I have placed the appropriate orders for post-discharge needs.    Review of Systems  Review of Systems   Constitutional:  Negative for chills, fever and malaise/fatigue.   Respiratory:  Positive for shortness of breath. Negative for cough and hemoptysis.    Cardiovascular:  Negative for chest pain, palpitations, orthopnea and PND.   Gastrointestinal:  Positive for abdominal pain.   Genitourinary:  Negative for dysuria and urgency.   Musculoskeletal:  Negative for back pain and myalgias.        Physical Exam  Temp:  [36.6 °C (97.8 °F)-37.1 °C (98.8 °F)] 36.6 °C (97.8 °F)  Pulse:  [63-70] 66  Resp:  [18-20] 20  BP: (125-156)/(60-89) 139/74  SpO2:  [92 %-95 %] 94 %    Physical Exam  Constitutional:       Appearance: Normal appearance.   HENT:      Head: Normocephalic and atraumatic.      Nose: Nose normal.   Eyes:      Extraocular Movements: Extraocular movements intact.      Pupils: Pupils are equal, round, and reactive to light.   Cardiovascular:      Rate and Rhythm: Normal rate and regular rhythm.      Pulses: Normal pulses.   Pulmonary:      Effort: Pulmonary effort is normal.      Breath sounds: Normal breath sounds.   Abdominal:      General: Abdomen is flat.      Comments: Mild discomfort on palpation   Skin:     Coloration:  Skin is not jaundiced or pale.   Neurological:      General: No focal deficit present.      Mental Status: He is alert and oriented to person, place, and time. Mental status is at baseline.   Psychiatric:         Mood and Affect: Mood normal.         Fluids    Intake/Output Summary (Last 24 hours) at 8/12/2023 1024  Last data filed at 8/12/2023 0417  Gross per 24 hour   Intake --   Output 300 ml   Net -300 ml       Laboratory  Recent Labs     08/11/23  1845 08/12/23  0146 08/12/23  0850   WBC 9.9 6.6  --    RBC 4.91 4.39*  --    HEMOGLOBIN 16.4 14.5 15.5   HEMATOCRIT 46.9 41.8* 45.1   MCV 95.5 95.2  --    MCH 33.4* 33.0  --    MCHC 35.0 34.7  --    RDW 45.3 45.7  --    PLATELETCT 142* 125*  --    MPV 9.8 10.0  --      Recent Labs     08/11/23  1815 08/12/23  0146   SODIUM 134* 136   POTASSIUM 4.1 4.0   CHLORIDE 99 103   CO2 20 23   GLUCOSE 104* 88   BUN 14 12   CREATININE 0.90 0.87   CALCIUM 9.5 8.6     Recent Labs     08/11/23  1814 08/11/23 1957   APTT 29.4 31.5   INR 1.10  --                Imaging  EC-ECHOCARDIOGRAM COMPLETE W/O CONT    (Results Pending)   CT-CHEST (THORAX) WITH    (Results Pending)        Assessment/Plan  Problem Representation:    * Portal vein thrombosis- (present on admission)  Assessment & Plan  From [facility]:  CTAP: newly occlusive thrombosis of the main portal vein near the confluence of the splenic vein and SMV with extension into the right and left portal vein and surrounding inflammation suggesting thrombophlebitis.  Cirrhosis with evidence of portal hypertension, including gastroesophageal varices    Discussed with GI, hold off anticoagulation at this time  Ordered hypercoagulable work-up, AFP, TEG, and pertinent labs as requested  GI follow-up appreciated    Pulmonary nodules  Assessment & Plan  Incidental 5 mm nodules noted  History of tobacco abuse  Informed patient about the prognosis of nodule.  Outpatient follow-up with size of nodule in 1 year    Polycythemia vera  (HCC)  Assessment & Plan  Possible P.Vera  Hgb 15  Heavy smoking History  Monitor for symptoms of itching after hot shower, burning hands and feet. Plethora.    Elevated LFTs  Assessment & Plan  Noted cirrhosis on CTAP  Follow up with  AFP      Abdominal pain  Assessment & Plan  2/2 portal vein thrombosis  Supportive pain control    ACP (advance care planning)  Assessment & Plan  Goal of care discussed with patient in S6.  He stated he would like to discuss GOC with wife for now.  In the meantime, agreeable for noninvasive, as well as invasive/heroic life-sustaining measures-including CPR/defibrillation/intubation mechanical ventilation as needed.  Diagnosis, prognosis, questions and concerns addressed.  Full CODE STATUS for now.  ACP: 17 minutes    Tobacco abuse  Assessment & Plan  Reported heavy tobacco use previously but stated he quit smoking approximately 25 years ago  He does admit to daily tobacco chewing, as well as marijuana use  Tobacco cessation counseling provided: 5 minutes  Discussed tobacco cessation for overall cardiovascular health benefit.  Offered nicotine patch, nicotine gum as alternative.  Provided standard tobacco cessation information per protocol    Esophageal varices without bleeding (HCC)  Assessment & Plan  Denies complaint of bleeding  He does have cirrhosis with portal hypertension  Avoid VTE prophylaxis, NSAID product  alcohol abstinence advised    IV Protonix for now  GI follow-up appreciated    Cirrhosis (HCC)  Assessment & Plan  Unclear etiology  Suspect secondary to EtOH given history of alcohol abuse  Alcohol abstinence advised         VTE prophylaxis: SCDs/TEDs    I have performed a physical exam and reviewed and updated ROS and Plan today (8/12/2023). In review of yesterday's note (8/11/2023), there are no changes except as documented above.

## 2023-08-12 NOTE — CONSULTS
Gastroenterology Consultation    Date of Service  8/12/2023    Referring Physician  Fernanda Meeks M.D.    Consulting Physician  Saroj Rucker M.D.    Reason for Consultation  Portal vein thrombosis    History of Presenting Illness  68 y.o. male with no significant PMH who presented 8/11/2023 with abdominal pain and portal vein thrombosis and new diagnosis of cirrhosis.    He has h/o alcohol use 1-2 beers per day but not heavier than that and has reduced alcohol intake to social use over past few years.  Has history of hepatitis C but was told that it was no longer there.    He notes developing more lower abdominal cramping and tenesmus type symptoms 3-4 days ago.  Felt like he need to go to the bathroom but nothing would come out.  Symptoms somewhat worse after eating.  Denies associated diarrhea, constipation, melena, hematochezia.  Notes decreased appetite and bloating sensation.  Had one episode of subjective fevers and chills.    Presented to OSH and had CT scan showing newly occlusive thrombosis of the main portal vein near the confluence of the splenic vein and SMV with extension into the right and left portal vein and surrounding inflammation suggesting thrombophlebitis.  Cirrhosis with evidence of portal hypertension, including gastroesophageal varices.  bilateral pulmonary nodules, measuring 5 mm or less.  Do not have images for review.     Review of Systems  Review of Systems   Gastrointestinal:  Positive for abdominal pain.   All other systems reviewed and are negative.      Past Medical History   has a past medical history of Asthma.    Surgical History  Tonsillectomy    Family History  Denies liver disease    Social History   reports that he quit smoking about 20 years ago. His smoking use included cigarettes. He quit smokeless tobacco use about 3 months ago.  His smokeless tobacco use included chew. He reports current alcohol use of about 3.6 oz of alcohol per week. He reports current drug use.  Drug: Inhaled. History of cocaine use distant past.  Has tattoos    Medications    Current Facility-Administered Medications:     HYDROmorphone    senna-docusate **AND** polyethylene glycol/lytes **AND** magnesium hydroxide **AND** bisacodyl    LR    labetalol    ondansetron    ondansetron    pantoprazole    Pharmacy Consult Request    oxyCODONE immediate-release **OR** oxyCODONE immediate-release **OR** [DISCONTINUED] HYDROmorphone    [Held by provider] NS    Nicotine Replacement Patient Education Materials **AND** nicotine polacrilex    No medications prior to admission.         Allergies  No Known Allergies    Physical Exam  Temp:  [36.6 °C (97.8 °F)-37.1 °C (98.8 °F)] 36.6 °C (97.8 °F)  Pulse:  [63-70] 66  Resp:  [18-20] 18  BP: (125-156)/(60-89) 139/74  SpO2:  [92 %-95 %] 94 %    Physical Exam  Vitals and nursing note reviewed.   Constitutional:       General: He is not in acute distress.     Appearance: Normal appearance. He is not ill-appearing.   HENT:      Head: Normocephalic and atraumatic.      Mouth/Throat:      Mouth: Mucous membranes are moist.      Pharynx: Oropharynx is clear.   Eyes:      General: No scleral icterus.     Extraocular Movements: Extraocular movements intact.      Pupils: Pupils are equal, round, and reactive to light.   Cardiovascular:      Rate and Rhythm: Normal rate and regular rhythm.      Pulses: Normal pulses.      Heart sounds: Normal heart sounds.   Pulmonary:      Effort: Pulmonary effort is normal.      Breath sounds: Normal breath sounds.   Abdominal:      General: Abdomen is flat. Bowel sounds are normal. There is no distension.      Palpations: Abdomen is soft.      Tenderness: There is abdominal tenderness in the right upper quadrant, epigastric area and left upper quadrant. There is no guarding or rebound.      Hernia: No hernia is present.   Musculoskeletal:      Right lower leg: No edema.      Left lower leg: No edema.   Skin:     General: Skin is warm and dry.    Neurological:      General: No focal deficit present.      Mental Status: He is alert and oriented to person, place, and time.   Psychiatric:         Mood and Affect: Mood normal.         Behavior: Behavior normal.         Fluids  Date 08/12/23 0700 - 08/13/23 0659   Shift 7233-7976 8020-4277 6231-9898 24 Hour Total   INTAKE   P.O. 120   120   Shift Total 120   120   OUTPUT   Shift Total       Weight (kg) 85.6 85.6 85.6 85.6       Laboratory  Recent Labs     08/11/23  1845 08/12/23  0146 08/12/23  0850   WBC 9.9 6.6  --    RBC 4.91 4.39*  --    HEMOGLOBIN 16.4 14.5 15.5   HEMATOCRIT 46.9 41.8* 45.1   MCV 95.5 95.2  --    MCH 33.4* 33.0  --    MCHC 35.0 34.7  --    RDW 45.3 45.7  --    PLATELETCT 142* 125*  --    MPV 9.8 10.0  --      Recent Labs     08/11/23  1815 08/12/23  0146   SODIUM 134* 136   POTASSIUM 4.1 4.0   CHLORIDE 99 103   CO2 20 23   GLUCOSE 104* 88   BUN 14 12   CREATININE 0.90 0.87   CALCIUM 9.5 8.6     Recent Labs     08/11/23 1814 08/11/23 1957   APTT 29.4 31.5   INR 1.10  --                  Imaging  EC-ECHOCARDIOGRAM COMPLETE W/O CONT    (Results Pending)   CT-CHEST (THORAX) WITH    (Results Pending)   CT-ABDOMEN-PELVIS WITH    (Results Pending)         Assessment/Plan  67 y/o with newly diagnosed cirrhosis likely HCV/EtOH with abdominal pain and imaging suggesting new portal vein thrombosis with some extension into SMV/splenic vein which could account for some of intestinal pain with eating.  Agree with hypercoagulable evaluation.  Recommend repeat CT at [facility] for review of PVT.  EGD to evaluate for varices.  Would start heparin drip in the meantime given symptomatic and new PVT.    PROBLEM:  Lower abdominal pain  Portal vein thrombus with extension into SMV/splnic confluence  New cirrhosis, suspect EtOH and HCV given positive HCV Ab  Marijuana use    PLAN:  EGD tomorrow.  NPO at midnight  Start heparin drip.  Hold heparin drip 4 hours prior to EGD  Repeat CT ab/pelvis with IV contrast  given also ordered to have CT chest  Await hypercoagulable evaluation  Check HCV RNA to confirm chronic HCV infection that would require treatment in future

## 2023-08-12 NOTE — ASSESSMENT & PLAN NOTE
Incidental 5 mm nodules noted  History of tobacco abuse  Informed patient about the prognosis of nodule.  Outpatient follow-up with size of nodule in 1 year

## 2023-08-12 NOTE — HOSPITAL COURSE
Dawit Maurer is a 68 y.o. male with history of alcohol abuse, tobacco abuse who presented 8/11/2023 as a direct transfer from [facility] for evaluation of abdominal distention with pain, noted to have portal vein thrombosis  Work-up from [facility] ER included: WBC 9.6, hemoglobin 18.1. CTAP: newly occlusive thrombosis of the main portal vein near the confluence of the splenic vein and SMV with extension into the right and left portal vein and surrounding inflammation suggesting thrombophlebitis.  Cirrhosis with evidence of portal hypertension, including gastroesophageal varices.  bilateral pulmonary nodules, measuring 5 mm or less. Patient stated ongoing abdominal distention, bloating, poor oral intake for the past few days.   Prednisone and nebulizer was started.  8/13/23-EGD reveals no overt esophageal varices, mild portal hypertensive gastropathy, gastritis, duodenitis, no high risk of bleeding from upper GI.  Patient was started on Eliquis 10 mg twice daily which will be titrated down after 2 weeks.  Started omeprazole 20 mg daily.  Patient will need to follow-up with gastroenterology regarding treatment of hepatitis C and management of cirrhosis.

## 2023-08-12 NOTE — CARE PLAN
The patient is Watcher - Medium risk of patient condition declining or worsening    Shift Goals  Clinical Goals: Patient will tolerate clear liquid diet overnight  Patient Goals: patient will sleep comfortably throughout shift    Progress made toward(s) clinical / shift goals:  Patient tolerating clear liquids until 0000 when patient went NPO per MD orders. Maintaining sats on room air, no c/o pain. Sleeping comfortably throughout shift. Urine collected, hat in bathroom for stool sample.       Problem: Knowledge Deficit - Standard  Goal: Patient and family/care givers will demonstrate understanding of plan of care, disease process/condition, diagnostic tests and medications  Outcome: Progressing     Problem: Hemodynamics  Goal: Patient's hemodynamics, fluid balance and neurologic status will be stable or improve  Outcome: Progressing       Patient is not progressing towards the following goals:

## 2023-08-12 NOTE — ASSESSMENT & PLAN NOTE
Reported heavy tobacco use previously but stated he quit smoking approximately 25 years ago  He does admit to daily tobacco chewing, as well as marijuana use  Tobacco cessation counseling provided: 5 minutes  Discussed tobacco cessation for overall cardiovascular health benefit.  Offered nicotine patch, nicotine gum as alternative.  Provided standard tobacco cessation information per protocol

## 2023-08-12 NOTE — CARE PLAN
VSS. On room air. Medicated for abd pain x1. Tolerating diet. Up self in room, voiding.     The patient is Stable - Low risk of patient condition declining or worsening    Shift Goals  Clinical Goals: CT abd/chest, NPO mn, EGD tomorrow   Patient Goals: patient will sleep comfortably throughout shift    Progress made toward(s) clinical / shift goals:    Problem: Knowledge Deficit - Standard  Goal: Patient and family/care givers will demonstrate understanding of plan of care, disease process/condition, diagnostic tests and medications  Outcome: Progressing     Problem: Hemodynamics  Goal: Patient's hemodynamics, fluid balance and neurologic status will be stable or improve  Outcome: Progressing     Problem: Fluid Volume  Goal: Fluid volume balance will be maintained  Outcome: Progressing     Problem: Respiratory  Goal: Patient will achieve/maintain optimum respiratory ventilation and gas exchange  Outcome: Progressing     Problem: Pain - Standard  Goal: Alleviation of pain or a reduction in pain to the patient’s comfort goal  Outcome: Progressing       Patient is not progressing towards the following goals:

## 2023-08-12 NOTE — ASSESSMENT & PLAN NOTE
Unclear etiology  Suspect secondary to EtOH given history of alcohol abuse  Alcohol abstinence advised  Patient follow-up with a GI for management of HCV

## 2023-08-12 NOTE — H&P
Hospital Medicine History & Physical Note    Date of Service  8/11/2023    Primary Care Physician  No primary care provider on file.    Consultants  GI (Dr. Valentin)    Code Status  Full Code    Chief Complaint  No chief complaint on file.  Abdominal pain    History of Presenting Illness  Dawit Maurer is a 68 y.o. male with history of alcohol abuse, tobacco abuse who presented 8/11/2023 as a direct transfer from [facility] for evaluation of abdominal distention, noted to have portal vein thrombosis, needing higher level care.    Work-up from [facility] ER included:  CBC: WBC 9.6, hemoglobin 18.1, platelet 159  CMP: Sodium 136, potassium 4.2, chloride 100, bicarb 22, glucose 129, BUN 12, creatinine 0.9, calcium 9.7, total protein 8.9, albumin 4.6, AST 74, , alkaline phosphatase 91, total bilirubin 0.9  PTT 25.1 PT 10.1  INR 1.03  Troponin I <0.05  Lipase 63  UA negative leukocyte esterase, negative nitrite    CTAP: newly occlusive thrombosis of the main portal vein near the confluence of the splenic vein and SMV with extension into the right and left portal vein and surrounding inflammation suggesting thrombophlebitis.  Cirrhosis with evidence of portal hypertension, including gastroesophageal varices.  bilateral pulmonary nodules, measuring 5 mm or less     Patient stated ongoing abdominal distention, bloating, poor oral intake for the past few days.  He went to ER as symptoms not resolving.  Patient was ultimately transferred to [facility] for GI evaluation.  He does admit to consuming large quantity of beer daily with barbecue, admits to current drinking.  Denies NSAID abuse. Patient was seen by me at [facility] arrival, admitted to medicine service for further evaluation and treatment.  I consulted on-call GI, recommended baseline labs, AFP, hypercoagulable work-up, TEG, NPO after midnight for possible endoscopic evaluation.    I discussed the plan of care with patient, bedside RN, and GI.    Review of  Systems  Review of Systems   Constitutional:  Negative for chills, fever and malaise/fatigue.   HENT:  Negative for hearing loss and tinnitus.    Eyes:  Negative for blurred vision and double vision.   Respiratory:  Negative for cough and shortness of breath.    Cardiovascular:  Negative for chest pain and palpitations.   Gastrointestinal:  Positive for abdominal pain, heartburn, nausea and vomiting. Negative for blood in stool.   Genitourinary:  Negative for dysuria and hematuria.   Musculoskeletal:  Negative for joint pain and myalgias.   Skin:  Negative for itching and rash.   Neurological:  Negative for dizziness and headaches.   Endo/Heme/Allergies:  Negative for environmental allergies. Does not bruise/bleed easily.   Psychiatric/Behavioral:  Negative for depression and substance abuse.    All other systems reviewed and are negative.      Past Medical History   has a past medical history of Asthma.  Alcohol dependence, tobacco dependence    Surgical History   has no past surgical history on file.     Family History  family history is not on file.   Family history reviewed with patient. There is no family history that is pertinent to the chief complaint.     Social History   reports that he quit smoking about 20 years ago. His smoking use included cigarettes. He quit smokeless tobacco use about 3 months ago.  His smokeless tobacco use included chew. He reports current alcohol use of about 3.6 oz of alcohol per week. He reports current drug use. Drug: Inhaled.Admits to daily alcohol use, in excess of 5-6 beers daily  Tobacco chewing, prior heavy tobacco smoking  Marijuana use, denies other illicit drug use    Allergies  No Known Allergies    Medications  None       Physical Exam  Temp:  [37 °C (98.6 °F)-37.1 °C (98.8 °F)] 37.1 °C (98.8 °F)  Pulse:  [69-70] 69  Resp:  [18] 18  BP: (152-156)/(85-89) 156/85  SpO2:  [92 %-93 %] 92 %                          Physical Exam  Vitals and nursing note reviewed.    Constitutional:       General: He is not in acute distress.  HENT:      Head: Normocephalic and atraumatic.      Nose: Nose normal.      Mouth/Throat:      Mouth: Mucous membranes are dry.      Pharynx: Oropharynx is clear.   Eyes:      General: No scleral icterus.     Extraocular Movements: Extraocular movements intact.   Cardiovascular:      Rate and Rhythm: Normal rate and regular rhythm.      Pulses: Normal pulses.      Heart sounds:      No friction rub.   Pulmonary:      Effort: No respiratory distress.      Breath sounds: No wheezing or rales.   Chest:      Chest wall: No tenderness.   Abdominal:      General: There is distension.      Tenderness: There is abdominal tenderness. There is no guarding or rebound.   Musculoskeletal:         General: No swelling or tenderness. Normal range of motion.      Cervical back: Neck supple. No tenderness.   Skin:     General: Skin is warm and dry.      Capillary Refill: Capillary refill takes less than 2 seconds.   Neurological:      General: No focal deficit present.      Mental Status: He is alert and oriented to person, place, and time.   Psychiatric:         Mood and Affect: Mood normal.         Laboratory:  Recent Labs     08/11/23 1845   WBC 9.9   RBC 4.91   HEMOGLOBIN 16.4   HEMATOCRIT 46.9   MCV 95.5   MCH 33.4*   MCHC 35.0   RDW 45.3   PLATELETCT 142*   MPV 9.8     Recent Labs     08/11/23 1815   SODIUM 134*   POTASSIUM 4.1   CHLORIDE 99   CO2 20   GLUCOSE 104*   BUN 14   CREATININE 0.90   CALCIUM 9.5     Recent Labs     08/11/23 1815 08/11/23 1957   ALTSGPT 84*  --    ASTSGOT 50*  --    ALKPHOSPHAT 72  --    TBILIRUBIN 0.6  --    GAMMAGT  --  70*   GLUCOSE 104*  --      Recent Labs     08/11/23 1814 08/11/23 1957   APTT 29.4 31.5   INR 1.10  --      No results for input(s): NTPROBNP in the last 72 hours.      No results for input(s): TROPONINT in the last 72 hours.    Imaging:  No orders to display       no X-Ray or EKG requiring  interpretation    Assessment/Plan:  Justification for Admission Status  I anticipate this patient will require at least 2 midnights of hospitalization, therefore appropriate for inpatient status.      * Portal vein thrombosis- (present on admission)  Assessment & Plan  From [facility]:  CTAP: newly occlusive thrombosis of the main portal vein near the confluence of the splenic vein and SMV with extension into the right and left portal vein and surrounding inflammation suggesting thrombophlebitis.  Cirrhosis with evidence of portal hypertension, including gastroesophageal varices    Discussed with GI, hold off anticoagulation at this time  Ordered hypercoagulable work-up, AFP, TEG, and pertinent labs as requested  GI follow-up appreciated    Esophageal varices without bleeding (HCC)  Assessment & Plan  Denies complaint of bleeding  He does have cirrhosis with portal hypertension  Avoid VTE prophylaxis, NSAID product  alcohol abstinence advised    IV Protonix for now  GI follow-up appreciated    Polycythemia vera (HCC)  Assessment & Plan  Possible P.Vera  Hgb 18.1, tobacco use    Elevated LFTs  Assessment & Plan  Noted cirrhosis on CTAP  Check viral hepatitis, AFP    Abdominal pain  Assessment & Plan  Supportive pain control    Cirrhosis (HCC)  Assessment & Plan  Unclear etiology  Suspect secondary to EtOH given history of alcohol abuse  Alcohol abstinence advised    Pulmonary nodules  Assessment & Plan  Incidental 5 mm nodules noted  History of tobacco abuse  Informed patient  Outpatient follow-up    ACP (advance care planning)  Assessment & Plan  Goal of care discussed with patient in S6.  He stated he would like to discuss GOC with wife for now.  In the meantime, agreeable for noninvasive, as well as invasive/heroic life-sustaining measures-including CPR/defibrillation/intubation mechanical ventilation as needed.  Diagnosis, prognosis, questions and concerns addressed.  Full CODE STATUS for now.  ACP: 17  minutes    Tobacco abuse  Assessment & Plan  Reported heavy tobacco use previously but stated he quit smoking approximately 25 years ago  He does admit to daily tobacco chewing, as well as marijuana use  Tobacco cessation counseling provided: 5 minutes  Discussed tobacco cessation for overall cardiovascular health benefit.  Offered nicotine patch, nicotine gum as alternative.  Provided standard tobacco cessation information per protocol        VTE prophylaxis: SCDs/TEDs

## 2023-08-13 ENCOUNTER — ANESTHESIA (OUTPATIENT)
Dept: SURGERY | Facility: MEDICAL CENTER | Age: 68
DRG: 442 | End: 2023-08-13
Payer: MEDICARE

## 2023-08-13 ENCOUNTER — ANESTHESIA EVENT (OUTPATIENT)
Dept: SURGERY | Facility: MEDICAL CENTER | Age: 68
DRG: 442 | End: 2023-08-13
Payer: MEDICARE

## 2023-08-13 LAB
AFP-TM SERPL-MCNC: 13 NG/ML (ref 0–9)
ALBUMIN SERPL BCP-MCNC: 3.6 G/DL (ref 3.2–4.9)
APTT PPP: 30.9 SEC (ref 24.7–36)
BUN SERPL-MCNC: 10 MG/DL (ref 8–22)
CALCIUM ALBUM COR SERPL-MCNC: 8.8 MG/DL (ref 8.5–10.5)
CALCIUM SERPL-MCNC: 8.5 MG/DL (ref 8.5–10.5)
CHLORIDE SERPL-SCNC: 100 MMOL/L (ref 96–112)
CO2 SERPL-SCNC: 24 MMOL/L (ref 20–33)
CREAT SERPL-MCNC: 0.8 MG/DL (ref 0.5–1.4)
GFR SERPLBLD CREATININE-BSD FMLA CKD-EPI: 97 ML/MIN/1.73 M 2
GLUCOSE SERPL-MCNC: 95 MG/DL (ref 65–99)
HAPTOGLOB SERPL-MCNC: 179 MG/DL (ref 30–200)
INR PPP: 1.13 (ref 0.87–1.13)
MITOCHONDRIA M2 IGG SER-ACNC: 4.7 UNITS (ref 0–24.9)
NUCLEAR IGG SER QL IA: NORMAL
PHOSPHATE SERPL-MCNC: 2.7 MG/DL (ref 2.5–4.5)
POTASSIUM SERPL-SCNC: 4 MMOL/L (ref 3.6–5.5)
PROTHROMBIN TIME: 14.4 SEC (ref 12–14.6)
SMA IGG SER-ACNC: 10 UNITS (ref 0–19)
SODIUM SERPL-SCNC: 133 MMOL/L (ref 135–145)
UFH PPP CHRO-ACNC: 0.83 IU/ML
UFH PPP CHRO-ACNC: 1 IU/ML
UFH PPP CHRO-ACNC: <0.1 IU/ML

## 2023-08-13 PROCEDURE — 700101 HCHG RX REV CODE 250: Performed by: STUDENT IN AN ORGANIZED HEALTH CARE EDUCATION/TRAINING PROGRAM

## 2023-08-13 PROCEDURE — 770006 HCHG ROOM/CARE - MED/SURG/GYN SEMI*

## 2023-08-13 PROCEDURE — 85520 HEPARIN ASSAY: CPT

## 2023-08-13 PROCEDURE — 85730 THROMBOPLASTIN TIME PARTIAL: CPT

## 2023-08-13 PROCEDURE — 160009 HCHG ANES TIME/MIN: Performed by: INTERNAL MEDICINE

## 2023-08-13 PROCEDURE — 700111 HCHG RX REV CODE 636 W/ 250 OVERRIDE (IP)

## 2023-08-13 PROCEDURE — 0DB98ZX EXCISION OF DUODENUM, VIA NATURAL OR ARTIFICIAL OPENING ENDOSCOPIC, DIAGNOSTIC: ICD-10-PCS | Performed by: INTERNAL MEDICINE

## 2023-08-13 PROCEDURE — 160035 HCHG PACU - 1ST 60 MINS PHASE I: Performed by: INTERNAL MEDICINE

## 2023-08-13 PROCEDURE — 700102 HCHG RX REV CODE 250 W/ 637 OVERRIDE(OP): Performed by: INTERNAL MEDICINE

## 2023-08-13 PROCEDURE — 160203 HCHG ENDO MINUTES - 1ST 30 MINS LEVEL 4: Performed by: INTERNAL MEDICINE

## 2023-08-13 PROCEDURE — 700101 HCHG RX REV CODE 250: Performed by: INTERNAL MEDICINE

## 2023-08-13 PROCEDURE — 700105 HCHG RX REV CODE 258: Mod: JZ | Performed by: STUDENT IN AN ORGANIZED HEALTH CARE EDUCATION/TRAINING PROGRAM

## 2023-08-13 PROCEDURE — 80069 RENAL FUNCTION PANEL: CPT

## 2023-08-13 PROCEDURE — 160048 HCHG OR STATISTICAL LEVEL 1-5: Performed by: INTERNAL MEDICINE

## 2023-08-13 PROCEDURE — 88305 TISSUE EXAM BY PATHOLOGIST: CPT

## 2023-08-13 PROCEDURE — 88312 SPECIAL STAINS GROUP 1: CPT

## 2023-08-13 PROCEDURE — 700111 HCHG RX REV CODE 636 W/ 250 OVERRIDE (IP): Mod: JZ | Performed by: STUDENT IN AN ORGANIZED HEALTH CARE EDUCATION/TRAINING PROGRAM

## 2023-08-13 PROCEDURE — C9113 INJ PANTOPRAZOLE SODIUM, VIA: HCPCS | Performed by: STUDENT IN AN ORGANIZED HEALTH CARE EDUCATION/TRAINING PROGRAM

## 2023-08-13 PROCEDURE — 85610 PROTHROMBIN TIME: CPT

## 2023-08-13 PROCEDURE — 700111 HCHG RX REV CODE 636 W/ 250 OVERRIDE (IP): Performed by: STUDENT IN AN ORGANIZED HEALTH CARE EDUCATION/TRAINING PROGRAM

## 2023-08-13 PROCEDURE — 43239 EGD BIOPSY SINGLE/MULTIPLE: CPT | Performed by: INTERNAL MEDICINE

## 2023-08-13 PROCEDURE — 700111 HCHG RX REV CODE 636 W/ 250 OVERRIDE (IP): Performed by: INTERNAL MEDICINE

## 2023-08-13 PROCEDURE — 0DB68ZX EXCISION OF STOMACH, VIA NATURAL OR ARTIFICIAL OPENING ENDOSCOPIC, DIAGNOSTIC: ICD-10-PCS | Performed by: INTERNAL MEDICINE

## 2023-08-13 PROCEDURE — A9270 NON-COVERED ITEM OR SERVICE: HCPCS | Performed by: INTERNAL MEDICINE

## 2023-08-13 PROCEDURE — 99233 SBSQ HOSP IP/OBS HIGH 50: CPT | Mod: GC | Performed by: INTERNAL MEDICINE

## 2023-08-13 PROCEDURE — 160002 HCHG RECOVERY MINUTES (STAT): Performed by: INTERNAL MEDICINE

## 2023-08-13 RX ORDER — LIDOCAINE HYDROCHLORIDE 20 MG/ML
INJECTION, SOLUTION EPIDURAL; INFILTRATION; INTRACAUDAL; PERINEURAL PRN
Status: DISCONTINUED | OUTPATIENT
Start: 2023-08-13 | End: 2023-08-13 | Stop reason: SURG

## 2023-08-13 RX ORDER — HEPARIN SODIUM 5000 [USP'U]/100ML
0-30 INJECTION, SOLUTION INTRAVENOUS CONTINUOUS
Status: DISCONTINUED | OUTPATIENT
Start: 2023-08-13 | End: 2023-08-13

## 2023-08-13 RX ORDER — SODIUM CHLORIDE, SODIUM LACTATE, POTASSIUM CHLORIDE, CALCIUM CHLORIDE 600; 310; 30; 20 MG/100ML; MG/100ML; MG/100ML; MG/100ML
INJECTION, SOLUTION INTRAVENOUS
Status: DISCONTINUED | OUTPATIENT
Start: 2023-08-13 | End: 2023-08-13 | Stop reason: SURG

## 2023-08-13 RX ORDER — HALOPERIDOL 5 MG/ML
1 INJECTION INTRAMUSCULAR
Status: DISCONTINUED | OUTPATIENT
Start: 2023-08-13 | End: 2023-08-13 | Stop reason: HOSPADM

## 2023-08-13 RX ORDER — OMEPRAZOLE 20 MG/1
20 CAPSULE, DELAYED RELEASE ORAL EVERY 12 HOURS
Status: DISCONTINUED | OUTPATIENT
Start: 2023-08-13 | End: 2023-08-14 | Stop reason: HOSPADM

## 2023-08-13 RX ORDER — IPRATROPIUM BROMIDE AND ALBUTEROL SULFATE 2.5; .5 MG/3ML; MG/3ML
3 SOLUTION RESPIRATORY (INHALATION)
Status: DISCONTINUED | OUTPATIENT
Start: 2023-08-13 | End: 2023-08-14 | Stop reason: HOSPADM

## 2023-08-13 RX ORDER — ONDANSETRON 2 MG/ML
4 INJECTION INTRAMUSCULAR; INTRAVENOUS
Status: DISCONTINUED | OUTPATIENT
Start: 2023-08-13 | End: 2023-08-13 | Stop reason: HOSPADM

## 2023-08-13 RX ORDER — DIPHENHYDRAMINE HYDROCHLORIDE 50 MG/ML
12.5 INJECTION INTRAMUSCULAR; INTRAVENOUS
Status: DISCONTINUED | OUTPATIENT
Start: 2023-08-13 | End: 2023-08-13 | Stop reason: HOSPADM

## 2023-08-13 RX ORDER — PREDNISONE 20 MG/1
40 TABLET ORAL DAILY
Status: DISCONTINUED | OUTPATIENT
Start: 2023-08-13 | End: 2023-08-14 | Stop reason: HOSPADM

## 2023-08-13 RX ADMIN — PANTOPRAZOLE SODIUM 40 MG: 40 INJECTION, POWDER, FOR SOLUTION INTRAVENOUS at 06:00

## 2023-08-13 RX ADMIN — LIDOCAINE HYDROCHLORIDE 100 MG: 20 INJECTION, SOLUTION EPIDURAL; INFILTRATION; INTRACAUDAL at 10:32

## 2023-08-13 RX ADMIN — APIXABAN 10 MG: 5 TABLET, FILM COATED ORAL at 15:42

## 2023-08-13 RX ADMIN — FENTANYL CITRATE 100 MCG: 50 INJECTION, SOLUTION INTRAMUSCULAR; INTRAVENOUS at 10:29

## 2023-08-13 RX ADMIN — HEPARIN SODIUM 16 UNITS/KG/HR: 5000 INJECTION, SOLUTION INTRAVENOUS at 01:50

## 2023-08-13 RX ADMIN — OMEPRAZOLE 20 MG: 20 CAPSULE, DELAYED RELEASE ORAL at 17:28

## 2023-08-13 RX ADMIN — PROPOFOL 50 MG: 10 INJECTION, EMULSION INTRAVENOUS at 10:38

## 2023-08-13 RX ADMIN — IPRATROPIUM BROMIDE AND ALBUTEROL SULFATE 3 ML: 2.5; .5 SOLUTION RESPIRATORY (INHALATION) at 18:55

## 2023-08-13 RX ADMIN — PREDNISONE 40 MG: 20 TABLET ORAL at 15:42

## 2023-08-13 RX ADMIN — HEPARIN SODIUM 18 UNITS/KG/HR: 5000 INJECTION, SOLUTION INTRAVENOUS at 13:19

## 2023-08-13 RX ADMIN — SODIUM CHLORIDE, POTASSIUM CHLORIDE, SODIUM LACTATE AND CALCIUM CHLORIDE: 600; 310; 30; 20 INJECTION, SOLUTION INTRAVENOUS at 10:29

## 2023-08-13 RX ADMIN — PROPOFOL 50 MG: 10 INJECTION, EMULSION INTRAVENOUS at 10:32

## 2023-08-13 ASSESSMENT — PAIN DESCRIPTION - PAIN TYPE
TYPE: ACUTE PAIN
TYPE: SURGICAL PAIN

## 2023-08-13 ASSESSMENT — COPD QUESTIONNAIRES
DURING THE PAST 4 WEEKS HOW MUCH DID YOU FEEL SHORT OF BREATH: SOME OF THE TIME
COPD SCREENING SCORE: 6
DO YOU EVER COUGH UP ANY MUCUS OR PHLEGM?: NO/ONLY WITH OCCASIONAL COLDS OR INFECTIONS
HAVE YOU SMOKED AT LEAST 100 CIGARETTES IN YOUR ENTIRE LIFE: YES

## 2023-08-13 ASSESSMENT — COGNITIVE AND FUNCTIONAL STATUS - GENERAL
MOBILITY SCORE: 24
SUGGESTED CMS G CODE MODIFIER DAILY ACTIVITY: CH
SUGGESTED CMS G CODE MODIFIER MOBILITY: CH
DAILY ACTIVITIY SCORE: 24

## 2023-08-13 ASSESSMENT — ENCOUNTER SYMPTOMS
MYALGIAS: 0
COUGH: 0
CHILLS: 0
BACK PAIN: 0
ABDOMINAL PAIN: 0
PND: 0
FEVER: 0
SHORTNESS OF BREATH: 0
PALPITATIONS: 0
ORTHOPNEA: 0
HEMOPTYSIS: 0

## 2023-08-13 ASSESSMENT — PAIN SCALES - GENERAL: PAIN_LEVEL: 0

## 2023-08-13 NOTE — ANESTHESIA PREPROCEDURE EVALUATION
Case: [id number] Date/Time: 08/13/23 0945    Procedure: GASTROSCOPY (Esophagus)    Anesthesia type: MAC    Pre-op diagnosis: portal vein thrombosis    Location: [facility] OR 07 / SURGERY [facility]    Surgeons: Mallory Oakley M.D.          Relevant Problems   CARDIAC   (positive) Esophageal varices without bleeding (HCC)   (positive) Portal vein thrombosis         (positive) Cirrhosis (HCC)      Other   (positive) Polycythemia vera (HCC)       Physical Exam    Airway   Mallampati: II  TM distance: >3 FB  Neck ROM: full       Cardiovascular - normal exam  Rhythm: regular  Rate: normal  (-) murmur     Dental - normal exam           Pulmonary - normal exam  Breath sounds clear to auscultation     Abdominal    Neurological - normal exam                 Anesthesia Plan    ASA 3   ASA physical status 3 criteria: alcohol and/or substance dependence or abuse    Plan - MAC               Induction: intravenous    Postoperative Plan: Postoperative administration of opioids is intended.    Pertinent diagnostic labs and testing reviewed    Informed Consent:    Anesthetic plan and risks discussed with patient.

## 2023-08-13 NOTE — PROGRESS NOTES
[facility] ATTENDING NOTE:       I saw and examined the patient and discussed the management with the resident staff.  I reviewed the resident's note and agree with the resident's findings and plan as documented in the resident's note except as documented in the attending note. Please reference resident daily note for complete information.     The chart was reviewed and summarized.  Available labs, imaging, O2 sats ,  EKGs were reviewed. Available nursing, consultant, and resident notes were reviewed. I am actively involved in the patient's care    DOS 8/13/2023    68-year-old man with past medical history of alcohol abuse 1-2 beers a day, little more sometimes on the weekend, last drink a week ago, never had history of alcohol withdrawals or admission in the hospital, short history of tobacco use when he was young, but works with pollution, was transfer from [facility] after he was found portal vein thrombosis.  CT scan outside facility was concerning also for esophageal varices, cirrhosis.  Incidental finding of bilateral pulmonary nodules measuring 5 mm or less  GI has been consulted. Heparin started 8/12/23 (held 4 hour before EGD)   HCV positive   CT chest/ abd/ pelvis with contrast completed 8/12/23- just  Incidental micronodules predominantly in the right lower lobe probably postinflammatory but nonspecific.   Pt has wheezing. 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  EGD completed and pt has duodenitis, portal hypertension, Mild portal hypertensive gastropathy. No overt esophageal varices  Biopsy to follow   AFP slight elevated   Continue Schedule inhalers duoneb  Start prednisone   Malignancy is on differential   To follow up with GI as OP for Hep C treatment   Started oral PPI  Start eliquis   Needs to be monitored overnight. Slowly advance diet   Breathing treatment   If stable dc tomorrow     Patient is has a high medical  complexity, complex decision making and is at high risk for complication, morbidity, and mortality.  My total time spent caring for the patient on the day of the encounter was 53 minutes.   This does not include time spent on separately billable procedures/tests.          72

## 2023-08-13 NOTE — CARE PLAN
The patient is Stable - Low risk of patient condition declining or worsening    Shift Goals  Clinical Goals: patient will maintain NPO status from midnight to end of shift  Patient Goals: pain will be managed throughout shift    Progress made toward(s) clinical / shift goals:  Patient is alert and oriented x4, c/o pain to abdomen relieved with PRN pain medications. NPO status maintained from midnight until end of shift. Heparin adjusted per protocol, not therapeutic yet at this time.      Problem: Knowledge Deficit - Standard  Goal: Patient and family/care givers will demonstrate understanding of plan of care, disease process/condition, diagnostic tests and medications  Outcome: Progressing     Problem: Pain - Standard  Goal: Alleviation of pain or a reduction in pain to the patient’s comfort goal  Outcome: Progressing       Patient is not progressing towards the following goals:

## 2023-08-13 NOTE — PROGRESS NOTES
[facility] Internal Medicine Daily Progress Note    Date of Service  8/13/2023    [facility] Team: R IM [facility] Team   Attending: Fernanda Meeks M.d.  Senior Resident: Dr. Mercy krause MD  Intern:  Dr. Felice chow MD  Contact Number: [phone]    Chief Complaint  Dawit Maurer is a 68 y.o. male admitted 8/11/2023 with abdominal pain.     Hospital Course  Dawit Maurer is a 68 y.o. male with history of alcohol abuse, tobacco abuse who presented 8/11/2023 as a direct transfer from [facility] for evaluation of abdominal distention, noted to have portal vein thrombosis, needing higher level care.   Work-up from [facility] ER included:  CBC: WBC 9.6, hemoglobin 18.1, platelet 159  CMP: Sodium 136, potassium 4.2, chloride 100, bicarb 22, glucose 129, BUN 12, creatinine 0.9, calcium 9.7, total protein 8.9, albumin 4.6, AST 74, , alkaline phosphatase 91, total bilirubin 0.9  PTT 25.1 PT 10.1  INR 1.03  Troponin I <0.05  Lipase 63  UA negative leukocyte esterase, negative nitrite.  CTAP: newly occlusive thrombosis of the main portal vein near the confluence of the splenic vein and SMV with extension into the right and left portal vein and surrounding inflammation suggesting thrombophlebitis.  Cirrhosis with evidence of portal hypertension, including gastroesophageal varices.  bilateral pulmonary nodules, measuring 5 mm or less. Patient stated ongoing abdominal distention, bloating, poor oral intake for the past few days.  He went to ER as symptoms not resolving.  Patient was ultimately transferred to [facility] for GI evaluation.  He does admit to consuming large quantity of beer daily with barbecue, admits to current drinking.  Denies NSAID abuse. Given his [place] history [place], Chest CT, echo and prednisone and nebulizer was started.  8/13/23-EGD reveals no overt esophageal varices, mild portal hypertensive gastropathy, gastritis, duodenitis, no high risk of bleeding from upper GI    Interval Problem  Update  Patient was seen and evaluated at bedside this AM.   Patient was scheduled for EGD which reveals no overt esophageal varices, mild portal hypertensive gastropathy, gastritis, duodenitis, no high risk of bleeding from upper GI    denies nausea, vomiting, chest pain.    Following up on Labs, coagulopathies and tumor markers.     He was advised not to smoke and drink.      I have discussed this patient's plan of care and discharge plan at IDT rounds today with Case Management, Nursing, Nursing leadership, and other members of the IDT team.    Consultants/Specialty  GI    Code Status  Full Code    Disposition  The patient is not medically cleared for discharge to home or a post-acute facility.      I have placed the appropriate orders for post-discharge needs.    Review of Systems  Review of Systems   Constitutional:  Negative for chills, fever and malaise/fatigue.   Respiratory:  Negative for cough, hemoptysis and shortness of breath.    Cardiovascular:  Negative for chest pain, palpitations, orthopnea and PND.   Gastrointestinal:  Negative for abdominal pain.   Genitourinary:  Negative for dysuria and urgency.   Musculoskeletal:  Negative for back pain and myalgias.        Physical Exam  Temp:  [35.9 °C (96.7 °F)-36.7 °C (98.1 °F)] 36.6 °C (97.8 °F)  Pulse:  [57-65] 57  Resp:  [16-20] 18  BP: (113-176)/(58-89) 135/75  SpO2:  [92 %-99 %] 95 %    Physical Exam  Constitutional:       Appearance: Normal appearance.   HENT:      Head: Normocephalic and atraumatic.      Nose: Nose normal.   Eyes:      Extraocular Movements: Extraocular movements intact.      Pupils: Pupils are equal, round, and reactive to light.   Cardiovascular:      Rate and Rhythm: Normal rate and regular rhythm.      Pulses: Normal pulses.   Pulmonary:      Effort: Pulmonary effort is normal.      Breath sounds: Normal breath sounds.   Abdominal:      General: Abdomen is flat.      Comments: Mild discomfort on palpation   Skin:     Coloration:  Skin is not jaundiced or pale.   Neurological:      General: No focal deficit present.      Mental Status: He is alert and oriented to person, place, and time. Mental status is at baseline.   Psychiatric:         Mood and Affect: Mood normal.         Fluids    Intake/Output Summary (Last 24 hours) at 8/13/2023 1445  Last data filed at 8/13/2023 1045  Gross per 24 hour   Intake 400 ml   Output 500 ml   Net -100 ml         Laboratory  Recent Labs     08/11/23  1845 08/12/23  0146 08/12/23  0850   WBC 9.9 6.6  --    RBC 4.91 4.39*  --    HEMOGLOBIN 16.4 14.5 15.5   HEMATOCRIT 46.9 41.8* 45.1   MCV 95.5 95.2  --    MCH 33.4* 33.0  --    MCHC 35.0 34.7  --    RDW 45.3 45.7  --    PLATELETCT 142* 125*  --    MPV 9.8 10.0  --        Recent Labs     08/11/23  1815 08/12/23  0146 08/13/23  0531   SODIUM 134* 136 133*   POTASSIUM 4.1 4.0 4.0   CHLORIDE 99 103 100   CO2 20 23 24   GLUCOSE 104* 88 95   BUN 14 12 10   CREATININE 0.90 0.87 0.80   CALCIUM 9.5 8.6 8.5       Recent Labs     08/11/23  1814 08/11/23  1957 08/12/23  1713 08/13/23  1220   APTT 29.4 31.5 27.5 30.9   INR 1.10  --  1.09 1.13                 Imaging  CT-CHEST,ABDOMEN,PELVIS WITH   Final Result      1.  There is no evidence of interstitial lung disease.   2.  No CT evidence of pulmonary hypertension.   3.  Incidental micronodules predominantly in the right lower lobe probably postinflammatory but nonspecific. Recommend follow-up per Fleischner criteria.   4.  Heterogeneous enhancement of the caudate lobe the liver which is mildly enlarged as well as mild enlargement of the lateral segment left lobe suggestive of underlying hepatocellular disease.   5.  There is extensive portal vein thrombosis involving the main portal vein as well as intrahepatic right and left branches. SMV and splenic vein are grossly patent.   6.  Question of mild inflammation of the gallbladder. No gallstones or biliary dilatation identified however.   7.  There is distal colonic  diverticulosis with mild wall thickening of the sigmoid colon possibly acute or subacute inflammation.   8.  No adenopathy.      Findings were discussed with JUAN RAMON TREVINO on 8/12/2023 at 5:00 PM.      EC-ECHOCARDIOGRAM COMPLETE W/O CONT    (Results Pending)          Assessment/Plan  Problem Representation:    * Portal vein thrombosis- (present on admission)  Assessment & Plan  CTAP: newly occlusive thrombosis of the main portal vein near the confluence of the splenic vein and SMV with extension into the right and left portal vein and surrounding inflammation suggesting thrombophlebitis.  Cirrhosis with evidence of portal hypertension, including gastroesophageal varices    8/12/2023 ordered hypercoagulable work-up, AFP, TEG, and pertinent labs as requested  8/13/2023 EGD reveals no obvious esophageal varices, but presence of gastritis duodenitis s/p biopsy    Plan:  We will start anticoagulation  Oral PPI twice daily  Outpatient GI follow-up for HCV management  Lowly resume diet    Pulmonary nodules  Assessment & Plan  Incidental 5 mm nodules noted  History of tobacco abuse  Informed patient about the prognosis of nodule.  Outpatient follow-up with size of nodule in 1 year    Polycythemia vera (HCC)  Assessment & Plan  Possible P.Vera  Hgb 15  Heavy smoking History  Monitor for symptoms of itching after hot shower, burning hands and feet. Plethora.    Elevated LFTs  Assessment & Plan  Likely 2/2 portal hypertension secondary to portal vein thrombosis  Noted cirrhosis on CTAP  Follow up with  AFP      Abdominal pain  Assessment & Plan  2/2 portal vein thrombosis  Supportive pain control  Monitor for nausea vomiting, abdominal distention  Resolved    ACP (advance care planning)  Assessment & Plan  Goal of care discussed with patient in S6.  He stated he would like to discuss GOC with wife for now.  In the meantime, agreeable for noninvasive, as well as invasive/heroic life-sustaining measures-including  CPR/defibrillation/intubation mechanical ventilation as needed.  Diagnosis, prognosis, questions and concerns addressed.  Full CODE STATUS for now.  ACP: 17 minutes    Tobacco abuse  Assessment & Plan  Reported heavy tobacco use previously but stated he quit smoking approximately 25 years ago  He does admit to daily tobacco chewing, as well as marijuana use  Tobacco cessation counseling provided: 5 minutes  Discussed tobacco cessation for overall cardiovascular health benefit.  Offered nicotine patch, nicotine gum as alternative.  Provided standard tobacco cessation information per protocol    Esophageal varices without bleeding (HCC)  Assessment & Plan  EGD today reveals no obvious esophageal varices but remarkable for gastritis duodenitis.  Denies complaint of bleeding  He does have cirrhosis with portal hypertension  alcohol abstinence advised    Plan:  Oral PPI twice daily  GI follow-up appreciated  Monitor for sign of active bleeding (hematemesis melena)    Cirrhosis (HCC)  Assessment & Plan  Unclear etiology  Suspect secondary to EtOH given history of alcohol abuse  Alcohol abstinence advised  Patient follow-up with a GI for management of HCV         VTE prophylaxis: Eliquis    I have performed a physical exam and reviewed and updated ROS and Plan today (8/13/2023). In review of yesterday's note (8/12/2023), there are no changes except as documented above.

## 2023-08-13 NOTE — ANESTHESIA TIME REPORT
Anesthesia Start and Stop Event Times     Date Time Event    8/13/2023 1022 Ready for Procedure     1029 Anesthesia Start     1045 Anesthesia Stop        Responsible Staff  08/13/23    Name Role Begin End    Blair Vera M.D. Anesth 1029 1045        Overtime Reason:  no overtime (within assigned shift)    Comments:

## 2023-08-13 NOTE — PROCEDURES
OPERATIVE REPORT    PATIENT:   Dawit Maurer   1955       PREOPERATIVE DIAGNOSES/INDICATIONS: Varices, Portal vein thrombosis.     POSTOPERATIVE DIAGNOSIS:   No overt esophageal varices.   Mild portal hypertensive gastropathy.   Gastritis, s/p biopsy.   Duodenitis, some irregular mucosa, s/p biopsy.   No high risk bleeding source from upper GI.     PROCEDURE:  ESOPHAGOGASTRODUODENOSCOPY    PHYSICIAN:  Mallory Oakley MD. MPH.    ANESTHESIA:  Per anesthesiologist or ICU team.     LOCATION: [facility]    CONSENT:  OBTAINED. The risks, benefits and alternatives of the procedure were discussed in details. The risks include and are not limited to bleeding, infection, perforation, missed lesions, and sedations risks (cardiopulmonary compromise and allergic reaction to medications).    DESCRIPTION: The patient presented to the procedure room.  After routine checkup was performed, patient was brought into the endoscopy suite.  Patient was placed on his left lateral decubitus position. A bite block was placed in patient's mouth. Patient was sedated by anesthesia.  Vital signs were monitored throughout the procedure.  Oxygenation support was provided throughout the procedure. Upper endoscope was inserted into patient's mouth and advanced to the second portion of the duodenum under direct visualization.      Once the site was reached and examined, the upper endoscope was withdrawn.  Retroflexion was made within the stomach.  The stomach was decompressed, scope was withdrawn and the procedure was terminated.    The patient tolerated the procedure well.  There were no immediate complications.    OPERATIVE FINDINGS:    No overt esophageal varices.   Mild portal hypertensive gastropathy.   Gastritis, s/p biopsy.   Duodenitis, some irregular mucosa, s/p biopsy.   No high risk bleeding source from upper GI.     RECOMMENDATIONS:  No objection of anticoag.   PPI 20-40mg daily oral dose.    Consider outpatient GI referral to have  follow up to complete the HCV management and further cirrhosis care.   Okay to resume diet slowly.   GI team will follow up on the pathology with the patient.     GI sign off.       This note has been transcribed with digital voice recognition software and although it has been reviewed may contain grammatical or word errors

## 2023-08-13 NOTE — ANESTHESIA POSTPROCEDURE EVALUATION
Patient: Dawit Maurer    Procedure Summary     Date: 08/13/23 Room / Location: [place] [address] / SURGERY [facility]    Anesthesia Start: 1029 Anesthesia Stop: 1045    Procedures:       GASTROSCOPY (Esophagus)      GASTROSCOPY, WITH BIOPSY (Esophagus) Diagnosis: (gastritis, duodenitis)    Surgeons: Mallory Oakley M.D. Responsible Provider: Blair Vera M.D.    Anesthesia Type: MAC ASA Status: 3          Final Anesthesia Type: MAC  Last vitals  BP   Blood Pressure : 138/69    Temp   36.6 °C (97.9 °F)    Pulse   62   Resp   17    SpO2   95 %      Anesthesia Post Evaluation    Patient location during evaluation: PACU  Patient participation: complete - patient participated  Level of consciousness: awake and alert  Pain score: 0    Airway patency: patent  Anesthetic complications: no  Cardiovascular status: hemodynamically stable  Respiratory status: acceptable  Hydration status: euvolemic    PONV: none          No notable events documented.     Nurse Pain Score: 0 (NPRS)

## 2023-08-14 ENCOUNTER — APPOINTMENT (OUTPATIENT)
Dept: CARDIOLOGY | Facility: MEDICAL CENTER | Age: 68
DRG: 442 | End: 2023-08-14
Payer: MEDICARE

## 2023-08-14 ENCOUNTER — PHARMACY VISIT (OUTPATIENT)
Dept: PHARMACY | Facility: MEDICAL CENTER | Age: 68
End: 2023-08-14
Payer: COMMERCIAL

## 2023-08-14 ENCOUNTER — APPOINTMENT (OUTPATIENT)
Dept: RADIOLOGY | Facility: MEDICAL CENTER | Age: 68
DRG: 442 | End: 2023-08-14
Payer: MEDICARE

## 2023-08-14 VITALS
RESPIRATION RATE: 18 BRPM | HEIGHT: 68 IN | DIASTOLIC BLOOD PRESSURE: 70 MMHG | WEIGHT: 188.71 LBS | HEART RATE: 84 BPM | OXYGEN SATURATION: 92 % | BODY MASS INDEX: 28.6 KG/M2 | TEMPERATURE: 97.6 F | SYSTOLIC BLOOD PRESSURE: 138 MMHG

## 2023-08-14 PROBLEM — R79.89 ELEVATED LFTS: Status: RESOLVED | Noted: 2023-08-11 | Resolved: 2023-08-14

## 2023-08-14 PROBLEM — R10.9 ABDOMINAL PAIN: Status: RESOLVED | Noted: 2023-08-11 | Resolved: 2023-08-14

## 2023-08-14 LAB
ALBUMIN SERPL BCP-MCNC: 3.8 G/DL (ref 3.2–4.9)
ALBUMIN/GLOB SERPL: 1.1 G/DL
ALP SERPL-CCNC: 59 U/L (ref 30–99)
ALT SERPL-CCNC: 55 U/L (ref 2–50)
ANION GAP SERPL CALC-SCNC: 11 MMOL/L (ref 7–16)
AST SERPL-CCNC: 46 U/L (ref 12–45)
AT III ACT/NOR PPP CHRO: 82 % (ref 76–128)
AT III AG ACT/NOR PPP IA: 73 % (ref 82–136)
BILIRUB SERPL-MCNC: 0.5 MG/DL (ref 0.1–1.5)
BUN SERPL-MCNC: 12 MG/DL (ref 8–22)
CALCIUM ALBUM COR SERPL-MCNC: 9.4 MG/DL (ref 8.5–10.5)
CALCIUM SERPL-MCNC: 9.2 MG/DL (ref 8.5–10.5)
CHLORIDE SERPL-SCNC: 103 MMOL/L (ref 96–112)
CO2 SERPL-SCNC: 23 MMOL/L (ref 20–33)
CREAT SERPL-MCNC: 0.72 MG/DL (ref 0.5–1.4)
ERYTHROCYTE [DISTWIDTH] IN BLOOD BY AUTOMATED COUNT: 43.4 FL (ref 35.9–50)
GFR SERPLBLD CREATININE-BSD FMLA CKD-EPI: 100 ML/MIN/1.73 M 2
GLOBULIN SER CALC-MCNC: 3.4 G/DL (ref 1.9–3.5)
GLUCOSE SERPL-MCNC: 122 MG/DL (ref 65–99)
HCT VFR BLD AUTO: 41.9 % (ref 42–52)
HCV RNA SERPL NAA+PROBE-ACNC: ABNORMAL IU/ML
HCV RNA SERPL NAA+PROBE-LOG IU: 5.11 LOG IU/ML
HCV RNA SERPL QL NAA+PROBE: DETECTED
HGB BLD-MCNC: 14.8 G/DL (ref 14–18)
INR PPP: 1.18 (ref 0.87–1.13)
MCH RBC QN AUTO: 33 PG (ref 27–33)
MCHC RBC AUTO-ENTMCNC: 35.3 G/DL (ref 32.3–36.5)
MCV RBC AUTO: 93.5 FL (ref 81.4–97.8)
PATHOLOGY CONSULT NOTE: NORMAL
PLATELET # BLD AUTO: 123 K/UL (ref 164–446)
PMV BLD AUTO: 9.7 FL (ref 9–12.9)
POTASSIUM SERPL-SCNC: 4.3 MMOL/L (ref 3.6–5.5)
PROT C ACT/NOR PPP: 91 % (ref 83–168)
PROT S ACT/NOR PPP: 61 % (ref 66–143)
PROT SERPL-MCNC: 7.2 G/DL (ref 6–8.2)
PROTHROMBIN TIME: 14.9 SEC (ref 12–14.6)
RBC # BLD AUTO: 4.48 M/UL (ref 4.7–6.1)
SODIUM SERPL-SCNC: 137 MMOL/L (ref 135–145)
WBC # BLD AUTO: 5.2 K/UL (ref 4.8–10.8)

## 2023-08-14 PROCEDURE — 700111 HCHG RX REV CODE 636 W/ 250 OVERRIDE (IP): Performed by: INTERNAL MEDICINE

## 2023-08-14 PROCEDURE — RXMED WILLOW AMBULATORY MEDICATION CHARGE

## 2023-08-14 PROCEDURE — 94640 AIRWAY INHALATION TREATMENT: CPT

## 2023-08-14 PROCEDURE — 85610 PROTHROMBIN TIME: CPT

## 2023-08-14 PROCEDURE — 85027 COMPLETE CBC AUTOMATED: CPT

## 2023-08-14 PROCEDURE — 700102 HCHG RX REV CODE 250 W/ 637 OVERRIDE(OP): Performed by: STUDENT IN AN ORGANIZED HEALTH CARE EDUCATION/TRAINING PROGRAM

## 2023-08-14 PROCEDURE — 99239 HOSP IP/OBS DSCHRG MGMT >30: CPT | Mod: GC | Performed by: INTERNAL MEDICINE

## 2023-08-14 PROCEDURE — 700102 HCHG RX REV CODE 250 W/ 637 OVERRIDE(OP): Performed by: INTERNAL MEDICINE

## 2023-08-14 PROCEDURE — A9270 NON-COVERED ITEM OR SERVICE: HCPCS | Performed by: INTERNAL MEDICINE

## 2023-08-14 PROCEDURE — 80053 COMPREHEN METABOLIC PANEL: CPT

## 2023-08-14 PROCEDURE — 700101 HCHG RX REV CODE 250: Performed by: INTERNAL MEDICINE

## 2023-08-14 PROCEDURE — A9270 NON-COVERED ITEM OR SERVICE: HCPCS | Performed by: STUDENT IN AN ORGANIZED HEALTH CARE EDUCATION/TRAINING PROGRAM

## 2023-08-14 PROCEDURE — 74018 RADEX ABDOMEN 1 VIEW: CPT

## 2023-08-14 RX ORDER — OMEPRAZOLE 20 MG/1
20 CAPSULE, DELAYED RELEASE ORAL EVERY 12 HOURS
Qty: 30 CAPSULE | Refills: 1 | Status: SHIPPED | OUTPATIENT
Start: 2023-08-14

## 2023-08-14 RX ORDER — PREDNISONE 20 MG/1
40 TABLET ORAL DAILY
Qty: 6 TABLET | Refills: 0 | Status: SHIPPED | OUTPATIENT
Start: 2023-08-14 | End: 2023-08-17

## 2023-08-14 RX ORDER — HYDROCODONE BITARTRATE AND ACETAMINOPHEN 5; 325 MG/1; MG/1
1 TABLET ORAL EVERY 4 HOURS PRN
Qty: 10 TABLET | Refills: 0 | Status: SHIPPED | OUTPATIENT
Start: 2023-08-14 | End: 2023-08-16

## 2023-08-14 RX ADMIN — OMEPRAZOLE 20 MG: 20 CAPSULE, DELAYED RELEASE ORAL at 05:38

## 2023-08-14 RX ADMIN — APIXABAN 10 MG: 5 TABLET, FILM COATED ORAL at 05:38

## 2023-08-14 RX ADMIN — PREDNISONE 40 MG: 20 TABLET ORAL at 05:38

## 2023-08-14 RX ADMIN — OXYCODONE HYDROCHLORIDE 10 MG: 10 TABLET ORAL at 06:08

## 2023-08-14 RX ADMIN — BISACODYL 10 MG: 10 SUPPOSITORY RECTAL at 12:00

## 2023-08-14 RX ADMIN — POLYETHYLENE GLYCOL 3350 1 PACKET: 17 POWDER, FOR SOLUTION ORAL at 12:00

## 2023-08-14 RX ADMIN — IPRATROPIUM BROMIDE AND ALBUTEROL SULFATE 3 ML: 2.5; .5 SOLUTION RESPIRATORY (INHALATION) at 07:58

## 2023-08-14 RX ADMIN — SENNOSIDES AND DOCUSATE SODIUM 2 TABLET: 50; 8.6 TABLET ORAL at 05:38

## 2023-08-14 NOTE — CARE PLAN
The patient is Stable - Low risk of patient condition declining or worsening    Shift Goals  Clinical Goals: Patient will tolerate diet at dinner time  Patient Goals: Patient will report pain is well controlled throughout shift  Family Goals: VANDA    Progress made toward(s) clinical / shift goals:  Patient is alert and oriented, no c/o pain overnight. Was not interested in dinner but is tolerating diet with no issues. Up self in room with no issues, treaded slipper socks on.       Problem: Knowledge Deficit - Standard  Goal: Patient and family/care givers will demonstrate understanding of plan of care, disease process/condition, diagnostic tests and medications  Outcome: Progressing     Problem: Pain - Standard  Goal: Alleviation of pain or a reduction in pain to the patient’s comfort goal  Outcome: Progressing     Problem: Physical Regulation  Goal: Diagnostic test results will improve  Outcome: Progressing  Goal: Signs and symptoms of infection will decrease  Outcome: Progressing       Patient is not progressing towards the following goals:

## 2023-08-14 NOTE — CARE PLAN
VSS. No c/o pain this shift. Up self in room, voiding. Call light in reach, able to call appropriately as needed.     patient is Stable - Low risk of patient condition declining or worsening    Shift Goals  Clinical Goals: Pain control, tolerate diet   Patient Goals: pain will be managed throughout shift    Progress made toward(s) clinical / shift goals:    Problem: Knowledge Deficit - Standard  Goal: Patient and family/care givers will demonstrate understanding of plan of care, disease process/condition, diagnostic tests and medications  Outcome: Progressing     Problem: Hemodynamics  Goal: Patient's hemodynamics, fluid balance and neurologic status will be stable or improve  Outcome: Progressing     Problem: Urinary - Renal Perfusion  Goal: Ability to achieve and maintain adequate renal perfusion and functioning will improve  Outcome: Progressing     Problem: Respiratory  Goal: Patient will achieve/maintain optimum respiratory ventilation and gas exchange  Outcome: Progressing       Patient is not progressing towards the following goals:

## 2023-08-14 NOTE — DISCHARGE INSTRUCTIONS
Apixaban Tablets  What is this medication?  APIXABAN (a PIX a ban) prevents or treats blood clots. It is also used to lower the risk of stroke in people with AFib (atrial fibrillation). It belongs to a group of medications called blood thinners.  This medicine may be used for other purposes; ask your health care provider or pharmacist if you have questions.  COMMON BRAND NAME(S): Eliquis  What should I tell my care team before I take this medication?  They need to know if you have any of these conditions:  Antiphospholipid antibody syndrome  Bleeding disorder  History of bleeding in the brain  History of blood clots  History of stomach bleeding  Kidney disease  Liver disease  Mechanical heart valve  Spinal surgery  An unusual or allergic reaction to apixaban, other medications, foods, dyes, or preservatives  Pregnant or trying to get pregnant  Breast-feeding  How should I use this medication?  Take this medication by mouth. For your therapy to work as well as possible, take each dose exactly as prescribed on the prescription label. Do not skip doses. Skipping doses or stopping this medication can increase your risk of a blood clot or stroke. Keep taking this medication unless your care team tells you to stop. Take it as directed on the prescription label at the same time every day. You can take it with or without food. If it upsets your stomach, take it with food.  A special MedGuide will be given to you by the pharmacist with each prescription and refill. Be sure to read this information carefully each time.  Talk to your care team about the use of this medication in children. Special care may be needed.  Overdosage: If you think you have taken too much of this medicine contact a poison control center or emergency room at once.  NOTE: This medicine is only for you. Do not share this medicine with others.  What if I miss a dose?  If you miss a dose, take it as soon as you can. If it is almost time for your next  dose, take only that dose. Do not take double or extra doses.  What may interact with this medication?  This medication may interact with the following:  Aspirin and aspirin-like medications  Certain medications for fungal infections like itraconazole and ketoconazole  Certain medications for seizures like carbamazepine and phenytoin  Certain medications for blood clots like enoxaparin, dalteparin, heparin, and warfarin  Clarithromycin  NSAIDs, medications for pain and inflammation, like ibuprofen or naproxen  Rifampin  Ritonavir  Jerson's wort  This list may not describe all possible interactions. Give your health care provider a list of all the medicines, herbs, non-prescription drugs, or dietary supplements you use. Also tell them if you smoke, drink alcohol, or use illegal drugs. Some items may interact with your medicine.  What should I watch for while using this medication?  Visit your healthcare professional for regular checks on your progress. You may need blood work done while you are taking this medication. Your condition will be monitored carefully while you are receiving this medication. It is important not to miss any appointments.  Avoid sports and activities that might cause injury while you are using this medication. Severe falls or injuries can cause unseen bleeding. Be careful when using sharp tools or knives. Consider using an electric razor. Take special care brushing or flossing your teeth. Report any injuries, bruising, or red spots on the skin to your healthcare professional.  If you are going to need surgery or other procedure, tell your healthcare professional that you are taking this medication.  Wear a medical ID bracelet or chain. Carry a card that describes your disease and details of your medication and dosage times.  What side effects may I notice from receiving this medication?  Side effects that you should report to your care team as soon as possible:  Allergic reactions--skin  rash, itching, hives, swelling of the face, lips, tongue, or throat  Bleeding--bloody or black, tar-like stools, vomiting blood or brown material that looks like coffee grounds, red or dark brown urine, small red or purple spots on the skin, unusual bruising or bleeding  Bleeding in the brain--severe headache, stiff neck, confusion, dizziness, change in vision, numbness or weakness of the face, arm, or leg, trouble speaking, trouble walking, vomiting  Heavy periods  This list may not describe all possible side effects. Call your doctor for medical advice about side effects. You may report side effects to FDA at 9-356-PKP-6950.  Where should I keep my medication?  Keep out of the reach of children and pets.  Store at room temperature between 20 and 25 degrees C (68 and 77 degrees F). Get rid of any unused medication after the expiration date.  To get rid of medications that are no longer needed or :  Take the medication to a medication take-back program. Check with your pharmacy or law enforcement to find a location.  If you cannot return the medication, check the label or package insert to see if the medication should be thrown out in the garbage or flushed down the toilet. If you are not sure, ask your care team. If it is safe to put in the trash, empty the medication out of the container. Mix the medication with cat litter, dirt, coffee grounds, or other unwanted substance. Seal the mixture in a bag or container. Put it in the trash.  NOTE: This sheet is a summary. It may not cover all possible information. If you have questions about this medicine, talk to your doctor, pharmacist, or health care provider.  ©  [employer]/Gold Standard (2022 00:00:00)

## 2023-08-14 NOTE — DISCHARGE SUMMARY
[facility] Internal Medicine Discharge Summary    Attending: Fernanda Meeks M.d.  Senior Resident: Dr. Baldemar Wallis  Intern:  Dr. Felice Mike  Contact Number: [phone]    CHIEF COMPLAINT ON ADMISSION  No chief complaint on file.      Reason for Admission  Occlusive thrombus of the portal vein    Admission Date  8/11/2023    CODE STATUS  Full Code    HPI & HOSPITAL COURSE  This is a 68 y.o. male here with portal vein thrombosis  Dawit Maurer is a 68 y.o. male with history of alcohol abuse, tobacco abuse who presented 8/11/2023 as a direct transfer from [facility] for evaluation of abdominal distention with pain, noted to have portal vein thrombosis  Work-up from [facility] ER included: WBC 9.6, hemoglobin 18.1. CTAP: newly occlusive thrombosis of the main portal vein near the confluence of the splenic vein and SMV with extension into the right and left portal vein and surrounding inflammation suggesting thrombophlebitis.  Cirrhosis with evidence of portal hypertension, including gastroesophageal varices.  bilateral pulmonary nodules, measuring 5 mm or less. Patient stated ongoing abdominal distention, bloating, poor oral intake for the past few days.   Prednisone and nebulizer was started.  8/13/23-EGD reveals no overt esophageal varices, mild portal hypertensive gastropathy, gastritis, duodenitis, no high risk of bleeding from upper GI.  Patient was started on Eliquis 10 mg twice daily which will be titrated down after 2 weeks.  Started omeprazole 20 mg daily.  Patient will need to follow-up with gastroenterology regarding treatment of hepatitis C and management of cirrhosis.          #Cirrhosis, #portal vein thrombosis, #abdominal pain   EGD completed on August 13, 2023 showing no overt varices with mild hypertensive gastropathy, gastritis, duodenitis with biopsies taken.  Pathology pending at this time; primary care to follow-up regarding these results.  aFP equals 13   -Eliquis 10 mg twice daily for 14 days  with 5 mg twice daily thereafter.   -Patient to follow-up outpatient with specialist regarding management of hepatitis C, cirrhotic management.   -Continuing prednisone for 5-day course ending on August 18.    -Starting omeprazole 20 mg daily    #Hepatitis C   Reactive antibody test.  Nucleic acid amplification test still pending at this time.  Patient to follow-up regarding management outpatient.    #Polycythemia   Possibly Vera. History of tobacco use disorder.   No active management during this admission    Therefore, he is discharged in fair and stable condition to home with close outpatient follow-up.    The patient met 2-midnight criteria for an inpatient stay at the time of discharge.    Discharge Date  08/14/2023    Physical Exam on Day of Discharge  Physical Exam  Constitutional:       Appearance: Normal appearance.   HENT:      Head: Normocephalic and atraumatic.      Nose: Nose normal.   Eyes:      Extraocular Movements: Extraocular movements intact.      Pupils: Pupils are equal, round, and reactive to light.   Cardiovascular:      Rate and Rhythm: Normal rate and regular rhythm.      Pulses: Normal pulses.   Pulmonary:      Effort: Pulmonary effort is normal.      Breath sounds: Normal breath sounds.   Abdominal:      General: Abdomen is flat.      Comments: Mild discomfort on palpation   Skin:     Coloration: Skin is not jaundiced or pale.   Neurological:      General: No focal deficit present.      Mental Status: He is alert and oriented to person, place, and time. Mental status is at baseline.   Psychiatric:         Mood and Affect: Mood normal.         FOLLOW UP ITEMS POST DISCHARGE  Hepatitis C treatment, cirrhosis management    DISCHARGE DIAGNOSES  Principal Problem:    Portal vein thrombosis (POA: Yes)  Active Problems:    Cirrhosis (HCC) (POA: Unknown)    Esophageal varices without bleeding (HCC) (POA: Unknown)    Tobacco abuse (POA: Unknown)    ACP (advance care planning) (POA: Unknown)     Polycythemia vera (HCC) (POA: Unknown)    Pulmonary nodules (POA: Unknown)  Resolved Problems:    Abdominal pain (POA: Unknown)    Elevated LFTs (POA: Unknown)      FOLLOW UP  No future appointments.  Mallory Oakley M.D.  [address] [facility] [address]  [place] [id number] [phone]    Follow up in 2 week(s)      [facility]IMARY [facility] [place] [address] [address] [facility] [address] [phone]  Follow up in 1 week(s)        MEDICATIONS ON DISCHARGE     Medication List        START taking these medications        Instructions   * apixaban 5mg Tabs  Commonly known as: Eliquis   Take 2 Tablets by mouth 2 times a day. Indications: DVT/PE  Dose: 10 mg     * apixaban 5mg Tabs  Start taking on: August 20, 2023  Commonly known as: Eliquis   Take 1 Tablet by mouth 2 times a day. Indications: DVT/PE  Dose: 5 mg     omeprazole 20 MG delayed-release capsule  Commonly known as: PriLOSEC   Take 1 Capsule by mouth every 12 hours.  Dose: 20 mg     predniSONE 20 MG Tabs  Commonly known as: Deltasone   Take 2 Tablets by mouth every day for 3 days.  Dose: 40 mg           * This list has 2 medication(s) that are the same as other medications prescribed for you. Read the directions carefully, and ask your doctor or other care provider to review them with you.                  Allergies  No Known Allergies    DIET  Orders Placed This Encounter   Procedures    Diet Order Diet: Regular     Standing Status:   Standing     Number of Occurrences:   1     Order Specific Question:   Diet:     Answer:   Regular [1]       ACTIVITY  As tolerated.  Weight bearing as tolerated    CONSULTATIONS  Gastroenterology    PROCEDURES  ESOPHAGOGASTRODUODENOSCOPY August 13, 2023    LABORATORY  Lab Results   Component Value Date    SODIUM 137 08/14/2023    POTASSIUM 4.3 08/14/2023    CHLORIDE 103 08/14/2023    CO2 23 08/14/2023    GLUCOSE 122 (H) 08/14/2023    BUN 12 08/14/2023    CREATININE 0.72 08/14/2023        Lab Results   Component Value Date    WBC 5.2  08/14/2023    HEMOGLOBIN 14.8 08/14/2023    HEMATOCRIT 41.9 (L) 08/14/2023    PLATELETCT 123 (L) 08/14/2023        Total time of the discharge process exceeds 45 minutes.

## 2023-08-22 LAB
JAK2 P.V617F BLD/T QL: NOT DETECTED
SPECIMEN SOURCE: NORMAL

## 2023-08-24 LAB — GENE XXX MUT ANL BLD/T: NOT DETECTED

## 2023-08-28 LAB — MPL P.W515 BLD/T QL: NOT DETECTED

## 2024-01-09 ENCOUNTER — HOSPITAL ENCOUNTER (OUTPATIENT)
Dept: RADIOLOGY | Facility: MEDICAL CENTER | Age: 69
End: 2024-01-09
Payer: MEDICARE

## (undated) DEVICE — KIT CUSTOM PROCEDURE SINGLE FOR ENDO  (15/CA)

## (undated) DEVICE — BLOCK BITE MAXI DENTAL RETENTION RIM (100EA/BX)

## (undated) DEVICE — FILM CASSETTE ENDO

## (undated) DEVICE — NEPTUNE 4 PORT MANIFOLD - (20/PK)

## (undated) DEVICE — ELECTRODE 850 FOAM ADHESIVE - HYDROGEL RADIOTRNSPRNT (50/PK)

## (undated) DEVICE — FORCEP RADIAL JAW 4 STANDARD CAPACITY W/NEEDLE 240CM (40EA/BX)

## (undated) DEVICE — CANISTER SUCTION RIGID RED 1500CC (40EA/CA)

## (undated) DEVICE — MASK PANORAMIC OXYGEN PRO2 (30EA/CA)

## (undated) DEVICE — SENSOR OXIMETER ADULT SPO2 RD SET (20EA/BX)

## (undated) DEVICE — CONTAINER, SPECIMEN, STERILE

## (undated) DEVICE — SODIUM CHL IRRIGATION 0.9% 1000ML (12EA/CA)

## (undated) DEVICE — SET LEADWIRE 5 LEAD BEDSIDE DISPOSABLE ECG (1SET OF 5/EA)

## (undated) DEVICE — TUBE CONNECTING SUCTION - CLEAR PLASTIC STERILE 72 IN (50EA/CA)

## (undated) DEVICE — WATER IRRIGATION STERILE 1000ML (12EA/CA)

## (undated) DEVICE — MASK OXYGEN VNYL ADLT MED CONC WITH 7 FOOT TUBING  - (50EA/CA)